# Patient Record
Sex: FEMALE | Race: WHITE | Employment: FULL TIME | URBAN - NONMETROPOLITAN AREA
[De-identification: names, ages, dates, MRNs, and addresses within clinical notes are randomized per-mention and may not be internally consistent; named-entity substitution may affect disease eponyms.]

---
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
## 2021-01-05 ENCOUNTER — TELEPHONE (OUTPATIENT)
Dept: OBGYN CLINIC | Age: 21
End: 2021-01-05

## 2021-01-06 ENCOUNTER — ROUTINE PRENATAL (OUTPATIENT)
Dept: OBGYN CLINIC | Age: 21
End: 2021-01-06

## 2021-01-06 ENCOUNTER — OFFICE VISIT (OUTPATIENT)
Dept: OBGYN CLINIC | Age: 21
End: 2021-01-06
Payer: COMMERCIAL

## 2021-01-06 VITALS
DIASTOLIC BLOOD PRESSURE: 70 MMHG | WEIGHT: 139.2 LBS | BODY MASS INDEX: 23.16 KG/M2 | HEART RATE: 104 BPM | SYSTOLIC BLOOD PRESSURE: 110 MMHG

## 2021-01-06 DIAGNOSIS — O26.899 RH NEGATIVE STATE IN ANTEPARTUM PERIOD: ICD-10-CM

## 2021-01-06 DIAGNOSIS — Z67.91 RH NEGATIVE STATE IN ANTEPARTUM PERIOD: ICD-10-CM

## 2021-01-06 DIAGNOSIS — Z34.92 PRENATAL CARE IN SECOND TRIMESTER: Primary | ICD-10-CM

## 2021-01-06 DIAGNOSIS — Z87.59 HISTORY OF MOLAR PREGNANCY: ICD-10-CM

## 2021-01-06 PROCEDURE — 76816 OB US FOLLOW-UP PER FETUS: CPT | Performed by: OBSTETRICS & GYNECOLOGY

## 2021-01-06 PROCEDURE — 0502F SUBSEQUENT PRENATAL CARE: CPT | Performed by: OBSTETRICS & GYNECOLOGY

## 2021-01-06 NOTE — PROGRESS NOTES
Return OB Office Visit    CC:   Chief Complaint   Patient presents with    Routine Prenatal Visit       HPI:  Pt seen and examined. No concerns/complaints. Denies VB, LOF, ctx. +FM. Maternal wellness questionnaire reviewed - no concerns today. Score 2. Objective:  /70   Pulse (!) 104   Wt 139 lb 3.2 oz (63.1 kg)   LMP 2020 (Exact Date)   BMI 23.16 kg/m²   Gen: AO, NAD  Abd: Soft, NT  FHT: 150    Assessment/Plan:  24 y.o.  at 24w1d (Estimated Date of Delivery: 21) presents for BRUCE appointment:    Problem List Items Addressed This Visit        Gynecology/Obstetric Problems    Prenatal care in second trimester - Primary     - FWB: reassuring by US today  - Genetic screening: patient declines at this time  - Anatomy scan: 12/10/2020 - suboptimal anatomy (no profile, spine, PCI), slightly echogenic bowel noted, normal fluid, CL 5.65cm, anterior placenta, female fetus    - completion anatomy 2020 with resolved echogenic bowel  - Flu shot: 10/14/2020  - Tdap: plan at 28wks  - PNL: A-/ab-, rubella immune, syphilis neg, HIV neg, HepB neg, Hemoglobin 13.7, UDS neg, GCCT/trich neg, UCx no growth         Rh negative state in antepartum period     Will need rhogam at 28wks and PP            Other    History of molar pregnancy     Short interval between surgery and recent pregnancy. Plan to monitor closely and will trend HCG after delivery           Other Visit Diagnoses     Echogenic bowel of fetus        Resolved on US today, discussed limitation of US and offered additional genetic testing wtih [id number] or CF carrier screening.  Pt declines at this time, CTM          Dispo: RTC in 4 weeks, GTT/CBC and growth US at that time  Mack Brock MD

## 2021-01-07 NOTE — ASSESSMENT & PLAN NOTE
- FWB: reassuring by US today  - Genetic screening: patient declines at this time  - Anatomy scan: 12/10/2020 - suboptimal anatomy (no profile, spine, PCI), slightly echogenic bowel noted, normal fluid, CL 5.65cm, anterior placenta, female fetus    - completion anatomy 1/7/2020 with resolved echogenic bowel  - Flu shot: 10/14/2020  - Tdap: plan at 28wks  - PNL: A-/ab-, rubella immune, syphilis neg, HIV neg, HepB neg, Hemoglobin 13.7, UDS neg, GCCT/trich neg, UCx no growth

## 2021-02-03 ENCOUNTER — OFFICE VISIT (OUTPATIENT)
Dept: OBGYN CLINIC | Age: 21
End: 2021-02-03
Payer: COMMERCIAL

## 2021-02-03 ENCOUNTER — ROUTINE PRENATAL (OUTPATIENT)
Dept: OBGYN CLINIC | Age: 21
End: 2021-02-03
Payer: COMMERCIAL

## 2021-02-03 VITALS
SYSTOLIC BLOOD PRESSURE: 106 MMHG | WEIGHT: 141.6 LBS | DIASTOLIC BLOOD PRESSURE: 68 MMHG | BODY MASS INDEX: 23.56 KG/M2 | HEART RATE: 99 BPM

## 2021-02-03 DIAGNOSIS — O35.EXX0 ANOMALY OF KIDNEY OF FETUS IN SINGLETON PREGNANCY: ICD-10-CM

## 2021-02-03 DIAGNOSIS — Z34.93 PRENATAL CARE IN THIRD TRIMESTER: Primary | ICD-10-CM

## 2021-02-03 DIAGNOSIS — Z87.59 HISTORY OF MOLAR PREGNANCY: ICD-10-CM

## 2021-02-03 DIAGNOSIS — O26.899 RH NEGATIVE STATE IN ANTEPARTUM PERIOD: ICD-10-CM

## 2021-02-03 DIAGNOSIS — Z67.91 RH NEGATIVE STATE IN ANTEPARTUM PERIOD: ICD-10-CM

## 2021-02-03 PROCEDURE — 76816 OB US FOLLOW-UP PER FETUS: CPT | Performed by: OBSTETRICS & GYNECOLOGY

## 2021-02-03 PROCEDURE — 36415 COLL VENOUS BLD VENIPUNCTURE: CPT | Performed by: OBSTETRICS & GYNECOLOGY

## 2021-02-03 PROCEDURE — 96372 THER/PROPH/DIAG INJ SC/IM: CPT | Performed by: OBSTETRICS & GYNECOLOGY

## 2021-02-03 PROCEDURE — 0502F SUBSEQUENT PRENATAL CARE: CPT | Performed by: OBSTETRICS & GYNECOLOGY

## 2021-02-03 NOTE — PROGRESS NOTES
Return OB Office Visit    CC:   Chief Complaint   Patient presents with    Routine Prenatal Visit       HPI:  Pt seen and examined. No concerns/complaints. Denies VB, LOF, ctx/cramps. +FM. Does have some     Maternal wellness questionnaire reviewed - no concerns today. Score 0. Objective:  /68   Pulse 99   Wt 141 lb 9.6 oz (64.2 kg)   LMP 07/21/2020 (Exact Date)   BMI 23.56 kg/m²   Gen: AO, NAD  Abd: Soft, NT  FHT: 141    Ultrasound:  Impression   OBSTETRICAL ULTRASOUND GROWTH       DATE: 02/03/2021       PHYSICIAN: Lieutenant Yaron MADSEN        SONOGRAPHER: LILIAM Lynn RUST       INDICATION: Growth       COMPARISON: 01/06/2021       TYPE OF SCAN: abdominal       FINDINGS:   A single viable intrauterine pregnancy is noted in cephalic presentation. Cardiac and somatic activity are noted.       The following values were obtained:   Fetal heart rate 141bpm   BPD 7.87cm 99 %   Head Circumference 27.54cm 78.7 %    Abdominal Circumference 25.19cm 79.1 %   Femur Length 5.44cm 53.4 %   Humerus Length 4.72cm 32.8 %   Amniotic fluid index 11.82cm   EFW 1388g 82.6 percentile       Amniotic fluid volume is normal. Based on sonographic criteria the estimated fetal age is 29weeks and 4days with EDC of 04/17/2021. There is a 10 day discordance with the established EDC of 04/27/2021.        The patient has an anterior placenta that is adequate distance in relation to the internal cervical os. The evaluation of the lower uterine segment and cervix reveals normal appearing anatomy.  The uterus is unremarkable/gravid.  Maternal ovaries and adnexae are not well visualized due to the size of the uterus and patient's gravid state.       Anatomy seen includes: heart, stomach, bladder   Fetal kidneys are enlarged bilaterally (right 4.59cm, left 3.66cm)       IMPRESSION:   Single live IUP in the third trimester. Adequate interval fetal growth. Enlarged bilateral kidneys.       Imaging is limited secondary to fetal position.    The patient is well aware of the limitations of ultrasound in the detection of anomalies.             Assessment/Plan:  24 y.o.  at 28w1d (Estimated Date of Delivery: 21) presents for [facility] [id number] appointment:     Problem List Items Addressed This Visit        Gynecology/Obstetric Problems    Prenatal care in third trimester - Primary     - FWB: reassuring by US today  - Genetic screening: patient declines at this time  - Anatomy scan: 12/10/2020 - suboptimal anatomy (no profile, spine, PCI), slightly echogenic bowel noted, normal fluid, CL 5.65cm, anterior placenta, female fetus    - completion anatomy 2020 with resolved echogenic bowel  - Flu shot: 10/14/2020  - Tdap: given 2/3/21  - PNL: A-/ab-, rubella immune, syphilis neg, HIV neg, HepB neg, Hemoglobin 13.7, UDS neg, GCCT/trich neg, UCx no growth    - GTT/CBC sent today         Relevant Orders    GLUCOSE CHALLENGE GESTATIONAL (Completed)    CBC WITH AUTO DIFFERENTIAL (Completed)    Rh negative state in antepartum period     Rhogam given 2/3/21  - plan postpartum work-up            Other    History of molar pregnancy     Short interval between surgery and recent pregnancy. 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  Will refer to [facility] for further ultrasound imaging/recommendatiosn          Dispo: RTC in 2 weeks  Almeta Meigs, MD

## 2021-02-03 NOTE — PROGRESS NOTES
Temp-97.3F infrared  Blood draw from L Cephalic x 1 attempt without difficulty. 0 SST, 1 PST, 1 LAV tubes drawn. Patient tolerated well. Shirin Frazier  12:25 PM Given Tdap (Adacel) vaccine 0.5mL IM  Site:Left deltoid. Lot #[id number]  Expiration Date: 11/07/22  [address] #[id number]. Patient tolerated well. No reaction noted after 20 minutes. VIS sheet provided. Administered by: Shirin Frazier   12:25 PM Given RhoGAM 300mcg IM  Blood Type:A neg  Current gestation: 28 weeks 1 days  Site:Left upper quad. gluteus. Lot #[id number]  Expiration Date: 12ARF8361  NDC #3188-7337-98. Patient tolerated well. No reaction noted after 20 minutes. ID card provided to patient. Administered by:  Shirin Marr

## 2021-02-04 ENCOUNTER — TELEPHONE (OUTPATIENT)
Dept: OBGYN CLINIC | Age: 21
End: 2021-02-04

## 2021-02-04 LAB
ABO/RH: NORMAL
ANTIBODY SCREEN: NORMAL
BASOPHILS ABSOLUTE: 0 K/UL (ref 0–0.2)
BASOPHILS RELATIVE PERCENT: 0.5 %
EOSINOPHILS ABSOLUTE: 0 K/UL (ref 0–0.6)
EOSINOPHILS RELATIVE PERCENT: 0.2 %
GLUCOSE CHALLENGE: 95 MG/DL
HCT VFR BLD CALC: 37.6 % (ref 36–48)
HEMOGLOBIN: 12.3 G/DL (ref 12–16)
LYMPHOCYTES ABSOLUTE: 0.8 K/UL (ref 1–5.1)
LYMPHOCYTES RELATIVE PERCENT: 8.5 %
MCH RBC QN AUTO: 30 PG (ref 26–34)
MCHC RBC AUTO-ENTMCNC: 32.7 G/DL (ref 31–36)
MCV RBC AUTO: 91.8 FL (ref 80–100)
MONOCYTES ABSOLUTE: 0.4 K/UL (ref 0–1.3)
MONOCYTES RELATIVE PERCENT: 4.4 %
NEUTROPHILS ABSOLUTE: 7.7 K/UL (ref 1.7–7.7)
NEUTROPHILS RELATIVE PERCENT: 86.4 %
PDW BLD-RTO: 14.2 % (ref 12.4–15.4)
PLATELET # BLD: 156 K/UL (ref 135–450)
PMV BLD AUTO: 9.6 FL (ref 5–10.5)
RBC # BLD: 4.1 M/UL (ref 4–5.2)
WBC # BLD: 9 K/UL (ref 4–11)

## 2021-02-04 NOTE — TELEPHONE ENCOUNTER
Pt calling again today. She says she started having a sharp pain from her belly up to her rib area. It has been pretty constant. She denies any bleeding or abnormal vaginal discharge. +FM last felt a few minutes ago. Pt states she thinks it may be Firman Calkin but was not sure. Pt encouraged to continue to rest and Hydrate today.  Please advise ( next appt 2/17/21)

## 2021-02-04 NOTE — TELEPHONE ENCOUNTER
Agree, would have her continue to monitor, stay hydrated throughout the day today and let us know if symptoms persist. Thanks.

## 2021-02-04 NOTE — ASSESSMENT & PLAN NOTE
- FWB: reassuring by US today  - Genetic screening: patient declines at this time  - Anatomy scan: 12/10/2020 - suboptimal anatomy (no profile, spine, PCI), slightly echogenic bowel noted, normal fluid, CL 5.65cm, anterior placenta, female fetus    - completion anatomy 1/7/2020 with resolved echogenic bowel  - Flu shot: 10/14/2020  - Tdap: given 2/3/21  - PNL: A-/ab-, rubella immune, syphilis neg, HIV neg, HepB neg, Hemoglobin 13.7, UDS neg, GCCT/trich neg, UCx no growth    - GTT/CBC sent today

## 2021-02-17 ENCOUNTER — ROUTINE PRENATAL (OUTPATIENT)
Dept: OBGYN CLINIC | Age: 21
End: 2021-02-17

## 2021-02-17 VITALS
WEIGHT: 148.4 LBS | DIASTOLIC BLOOD PRESSURE: 86 MMHG | SYSTOLIC BLOOD PRESSURE: 114 MMHG | HEART RATE: 106 BPM | BODY MASS INDEX: 24.7 KG/M2

## 2021-02-17 DIAGNOSIS — Z34.93 PRENATAL CARE IN THIRD TRIMESTER: Primary | ICD-10-CM

## 2021-02-17 DIAGNOSIS — Z67.91 RH NEGATIVE STATE IN ANTEPARTUM PERIOD: ICD-10-CM

## 2021-02-17 DIAGNOSIS — O35.EXX0 ANOMALY OF KIDNEY OF FETUS IN SINGLETON PREGNANCY: ICD-10-CM

## 2021-02-17 DIAGNOSIS — O26.899 RH NEGATIVE STATE IN ANTEPARTUM PERIOD: ICD-10-CM

## 2021-02-17 DIAGNOSIS — Z87.59 HISTORY OF MOLAR PREGNANCY: ICD-10-CM

## 2021-02-17 PROCEDURE — 0502F SUBSEQUENT PRENATAL CARE: CPT | Performed by: OBSTETRICS & GYNECOLOGY

## 2021-02-17 NOTE — PROGRESS NOTES
Temp-97.4f infrared  Maternal emotional well being screening form completed and reviewed with patient. Current score is 2. Patient given referral to [facility] ([phone]):  No

## 2021-02-17 NOTE — PROGRESS NOTES
Return OB Office Visit    CC:   Chief Complaint   Patient presents with    Routine Prenatal Visit       HPI:  Pt seen and examined. No concerns/complaints. Denies VB, LOF, ctx. +FM. Saw MFM yesterday for US, reports scanned in media but noted to be only slightly enlarged kidney on one side. Maternal wellness questionnaire reviewed - no concerns today. Score 0. Objective:  /86   Pulse (!) 106   Wt 148 lb 6.4 oz (67.3 kg)   LMP 2020 (Exact Date)   BMI 24.70 kg/m²   Gen: AO, NAD  Abd: Soft, NT  FHT: 135  FH: 30cm, vertex by Leopolds  Ext: Mild LE edema    Assessment/Plan:  24 y.o.  at 30w1d (Estimated Date of Delivery: 21) presents for BRUCE appointment:     Problem List Items Addressed This Visit        Gynecology/Obstetric Problems    Prenatal care in third trimester - Primary     - FWB: reassuring by US today  - Genetic screening: patient declines at this time  - Anatomy scan: 12/10/2020 - suboptimal anatomy (no profile, spine, PCI), slightly echogenic bowel noted, normal fluid, CL 5.65cm, anterior placenta, female fetus    - completion anatomy 2020 with resolved echogenic bowel  - Flu shot: 10/14/2020  - Tdap: given 2/3/21  - PNL: A-/ab-, rubella immune, syphilis neg, HIV neg, HepB neg, Hemoglobin 13.7, UDS neg, GCCT/trich neg, UCx no growth    - GTT 95, Hgb 12.3, Platelets 795  - Breast pump rx sent 2/3/21         Rh negative state in antepartum period     Rhogam given 2/3/21  - plan postpartum work-up         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  Plan to monitor closely and will trend HCG after delivery               Dispo: RTC in 2 weeks  Soy Gibbs MD

## 2021-02-18 NOTE — ASSESSMENT & PLAN NOTE
- FWB: reassuring by US today  - Genetic screening: patient declines at this time  - Anatomy scan: 12/10/2020 - suboptimal anatomy (no profile, spine, PCI), slightly echogenic bowel noted, normal fluid, CL 5.65cm, anterior placenta, female fetus    - completion anatomy 1/7/2020 with resolved echogenic bowel  - Flu shot: 10/14/2020  - Tdap: given 2/3/21  - PNL: A-/ab-, rubella immune, syphilis neg, HIV neg, HepB neg, Hemoglobin 13.7, UDS neg, GCCT/trich neg, UCx no growth    - GTT 95, Hgb 12.3, Platelets 765  - Breast pump rx sent 2/3/21

## 2021-03-03 ENCOUNTER — ROUTINE PRENATAL (OUTPATIENT)
Dept: OBGYN CLINIC | Age: 21
End: 2021-03-03

## 2021-03-03 VITALS
WEIGHT: 148.2 LBS | SYSTOLIC BLOOD PRESSURE: 102 MMHG | DIASTOLIC BLOOD PRESSURE: 80 MMHG | BODY MASS INDEX: 24.66 KG/M2 | HEART RATE: 99 BPM

## 2021-03-03 DIAGNOSIS — O35.EXX0 ANOMALY OF KIDNEY OF FETUS IN SINGLETON PREGNANCY: ICD-10-CM

## 2021-03-03 DIAGNOSIS — O21.9 NAUSEA AND VOMITING OF PREGNANCY, ANTEPARTUM: ICD-10-CM

## 2021-03-03 DIAGNOSIS — Z87.59 HISTORY OF MOLAR PREGNANCY: ICD-10-CM

## 2021-03-03 DIAGNOSIS — Z34.93 PRENATAL CARE IN THIRD TRIMESTER: Primary | ICD-10-CM

## 2021-03-03 DIAGNOSIS — O26.899 RH NEGATIVE STATE IN ANTEPARTUM PERIOD: ICD-10-CM

## 2021-03-03 DIAGNOSIS — Z67.91 RH NEGATIVE STATE IN ANTEPARTUM PERIOD: ICD-10-CM

## 2021-03-03 PROCEDURE — 0502F SUBSEQUENT PRENATAL CARE: CPT | Performed by: OBSTETRICS & GYNECOLOGY

## 2021-03-03 NOTE — PROGRESS NOTES
Temp: 97.1f oral  Maternal emotional well being screening form completed and reviewed with patient. Current score is 0. Patient given referral to [address] [place] [address] ([phone]):  No

## 2021-03-03 NOTE — PROGRESS NOTES
Return OB Office Visit    CC:   Chief Complaint   Patient presents with    Routine Prenatal Visit       HPI:  Patient seen and examined. Overall doing well today. Denies VB, LOF, ctx. Reports increased watery discharge for the past couple of nights. Denies need to wear a pad. Reports mild cramping, no contractions. +FM. Denies headaches, vision changes, RUQ pain, increased LE edema. Denies chest pain, shortness of breath, fever, chills, nausea, vomiting. Objective:  /80   Pulse 99   Wt 148 lb 3.2 oz (67.2 kg)   LMP 2020 (Exact Date)   BMI 24.66 kg/m²     Physical Exam  Vitals signs reviewed. Exam conducted with a chaperone present. Constitutional:       General: She is not in acute distress. Appearance: She is well-developed. HENT:      Head: Normocephalic and atraumatic. Eyes:      Conjunctiva/sclera: Conjunctivae normal.   Cardiovascular:      Rate and Rhythm: Normal rate. Pulmonary:      Effort: Pulmonary effort is normal. No respiratory distress. Abdominal:      General: There is no distension. Palpations: Abdomen is soft. Tenderness: There is no abdominal tenderness. There is no guarding or rebound. Genitourinary:     General: Normal vulva. Vagina: No vaginal discharge. Comments: Negative ferning, pooling and nitrazine  Musculoskeletal:         General: No swelling. Skin:     General: Skin is warm and dry. Neurological:      Mental Status: She is alert and oriented to person, place, and time. Psychiatric:         Mood and Affect: Mood normal.         Behavior: Behavior normal.         Thought Content: Thought content normal.         FHR: 147 bpm by doppler    Assessment/Plan:   Madeleine Coronado is a 24 y.o.  at 32w1d who presents for routine OB visit    1.  Prenatal care in third trimester     - Doing well today without complaints     - Fetal wellbeing reassuring by FH and FHR today     - Maternal wellness questionnaire reviewed - no concerns today, score 0     - Clear watery discharge - negative evaluation for PPROM today     - Labor, decreased FM, VB, LOF precautions reviewed     - Follow-up in 2 weeks for BRUCE visit and repeat US of fetal kidneys    2. Rh negative state in antepartum period     - Received Rhogam at 28 weeks     - Plan for PP evaluation    3. 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 Nausea and vomiting of pregnancy, antepartum     - Doing well    5.  History of molar pregnancy      Guille Young DO

## 2021-03-04 ENCOUNTER — TELEPHONE (OUTPATIENT)
Dept: OBGYN CLINIC | Age: 21
End: 2021-03-04

## 2021-03-05 NOTE — TELEPHONE ENCOUNTER
[phone] (home)     Patient called and made aware. Verbalized understanding. No questions or concerns voiced.

## 2021-03-05 NOTE — TELEPHONE ENCOUNTER
[phone] (home)     Called patient, stated she is cramping slightly now as well. Stated she is having slight discharge \"watery\" sometimes with white noted in it. Denies bug gush of fluid, was checked on weds for water breaking and was negative.      Routing to Dr. Neo Lipscomb

## 2021-03-05 NOTE — TELEPHONE ENCOUNTER
Would continue to observe and encourage hydration if symptoms are the same as in the office earlier this week. Discharge can increase towards the end of pregnancy, but if she has a large gush of fluid or is needed to change pads regularly will need evaluation. Also given labor precautions, encourage hydration to help with cramping. Thanks.

## 2021-03-10 ENCOUNTER — TELEPHONE (OUTPATIENT)
Dept: OBGYN CLINIC | Age: 21
End: 2021-03-10

## 2021-03-10 NOTE — TELEPHONE ENCOUNTER
Pt states she spoke with you yesterday and was instructed to come in office today but she is feeling better, no longer having pressure and feels she is ok to wait till schedule appointment on 3/17.  Routing as Marcelina Ford

## 2021-03-12 ENCOUNTER — ROUTINE PRENATAL (OUTPATIENT)
Dept: OBGYN CLINIC | Age: 21
End: 2021-03-12

## 2021-03-12 VITALS
BODY MASS INDEX: 24.93 KG/M2 | DIASTOLIC BLOOD PRESSURE: 82 MMHG | SYSTOLIC BLOOD PRESSURE: 112 MMHG | HEART RATE: 119 BPM | WEIGHT: 149.8 LBS

## 2021-03-12 DIAGNOSIS — R35.0 URINARY FREQUENCY: ICD-10-CM

## 2021-03-12 DIAGNOSIS — N89.8 VAGINAL DISCHARGE DURING PREGNANCY IN THIRD TRIMESTER: Primary | ICD-10-CM

## 2021-03-12 DIAGNOSIS — O26.893 VAGINAL DISCHARGE DURING PREGNANCY IN THIRD TRIMESTER: Primary | ICD-10-CM

## 2021-03-12 PROCEDURE — 0502F SUBSEQUENT PRENATAL CARE: CPT | Performed by: OBSTETRICS & GYNECOLOGY

## 2021-03-12 NOTE — PROGRESS NOTES
UA CHEMSTRIP      Glucose -NEG  Bilirubin- NEG   Ketone- NEG  Sp. Grav - 1.030  Blood- + small  PH- 6.5  Protein- NEG  urobil- NEG  Nitrates- NEG  Leukocyte- NEG

## 2021-03-12 NOTE — PROGRESS NOTES
Return OB Office Visit    CC:   Chief Complaint   Patient presents with    Vaginal Discharge       HPI:  Pt here today for vaginal discharge. Has had a lot of vaginal pressure. Also having more watery discharge since her last appointment. No contractions, no VB, +FM. Says she got really nervous about the amount of discharge so wanted to come in to be evaluated. Also reporting the constant urge to urinate, no dysuria, no hematuria, no malodorous urine. +frequency and urgency, worse towards end of the day. Maternal wellness questionnaire reviewed - no concerns today. Score 0. Objective:  /82   Pulse (!) 119   Wt 149 lb 12.8 oz (67.9 kg)   LMP 2020 (Exact Date)   BMI 24.93 kg/m²   Gen: AO, NAD  Abd: Soft, NT  FHT: 153  FH: 32cm, veertex by Leopolds  Ext: Mild LE edema  SVE: closed/long/high  SSE: negative pool/fern/valsalva/nitrazine    Assessment/Plan:  24 y.o.  at 33w3d (Estimated Date of Delivery: 21) presents for vaginal discharge    1. Vaginal discharge during pregnancy in third trimester  Patient with increasing discharge, exam negative for ROM today. Suspect likely physiologic, return precautions reviewed. Cervix closed, discsused use of belly band to help with vaginal pressure and labor precautions discussed as well.     2. Urinary frequency  POCT UA negative for leuk/nitrites, will send culture today and treat as indicated  - URINALYSIS WITH MICROSCOPIC  - Culture, Urine    Dispo: RTC as scheduled for routine OB visit  Sumeet Gonzales MD

## 2021-03-12 NOTE — PROGRESS NOTES
Temp-97.0F infrared  Maternal emotional well being screening form completed and reviewed with patient. Current score is 0. Patient given referral to [address] [place] [address] ([phone]):  No

## 2021-03-13 LAB
BACTERIA: ABNORMAL /HPF
BILIRUBIN URINE: NEGATIVE
BLOOD, URINE: NEGATIVE
CLARITY: ABNORMAL
COLOR: YELLOW
EPITHELIAL CELLS, UA: 27 /HPF (ref 0–5)
GLUCOSE URINE: NEGATIVE MG/DL
HYALINE CASTS: 7 /LPF (ref 0–8)
KETONES, URINE: NEGATIVE MG/DL
LEUKOCYTE ESTERASE, URINE: ABNORMAL
MICROSCOPIC EXAMINATION: YES
NITRITE, URINE: NEGATIVE
PH UA: 7 (ref 5–8)
PROTEIN UA: NEGATIVE MG/DL
RBC UA: 2 /HPF (ref 0–4)
SPECIFIC GRAVITY UA: 1.02 (ref 1–1.03)
URINE TYPE: ABNORMAL
UROBILINOGEN, URINE: 0.2 E.U./DL
WBC UA: 9 /HPF (ref 0–5)

## 2021-03-14 LAB — URINE CULTURE, ROUTINE: NORMAL

## 2021-03-16 ENCOUNTER — TELEPHONE (OUTPATIENT)
Dept: OBGYN CLINIC | Age: 21
End: 2021-03-16

## 2021-03-17 ENCOUNTER — ROUTINE PRENATAL (OUTPATIENT)
Dept: OBGYN CLINIC | Age: 21
End: 2021-03-17

## 2021-03-17 ENCOUNTER — OFFICE VISIT (OUTPATIENT)
Dept: OBGYN CLINIC | Age: 21
End: 2021-03-17
Payer: COMMERCIAL

## 2021-03-17 VITALS
BODY MASS INDEX: 25.13 KG/M2 | WEIGHT: 151 LBS | DIASTOLIC BLOOD PRESSURE: 80 MMHG | HEART RATE: 100 BPM | SYSTOLIC BLOOD PRESSURE: 104 MMHG

## 2021-03-17 DIAGNOSIS — Z87.59 HISTORY OF MOLAR PREGNANCY: ICD-10-CM

## 2021-03-17 DIAGNOSIS — Z67.91 RH NEGATIVE STATE IN ANTEPARTUM PERIOD: ICD-10-CM

## 2021-03-17 DIAGNOSIS — O35.EXX0 ANOMALY OF KIDNEY OF FETUS IN SINGLETON PREGNANCY: ICD-10-CM

## 2021-03-17 DIAGNOSIS — O35.EXX0 ANOMALY OF KIDNEY OF FETUS IN SINGLETON PREGNANCY: Primary | ICD-10-CM

## 2021-03-17 DIAGNOSIS — Z34.93 PRENATAL CARE IN THIRD TRIMESTER: Primary | ICD-10-CM

## 2021-03-17 DIAGNOSIS — O26.899 RH NEGATIVE STATE IN ANTEPARTUM PERIOD: ICD-10-CM

## 2021-03-17 PROCEDURE — 76816 OB US FOLLOW-UP PER FETUS: CPT | Performed by: OBSTETRICS & GYNECOLOGY

## 2021-03-17 PROCEDURE — 0502F SUBSEQUENT PRENATAL CARE: CPT | Performed by: OBSTETRICS & GYNECOLOGY

## 2021-03-17 NOTE — PROGRESS NOTES
Temp-98.0f infrared  Maternal emotional well being screening form completed and reviewed with patient. Current score is 0. Patient given referral to [address] [place] [address] ([phone]):  No

## 2021-03-18 NOTE — ASSESSMENT & PLAN NOTE
Short interval between surgery and recent pregnancy.  Plan to monitor closely and will trend HCG after delivery 2007

## 2021-03-18 NOTE — PROGRESS NOTES
Return OB Office Visit    CC:   Chief Complaint   Patient presents with    Routine Prenatal Visit       HPI:  Pt seen and examined. No concerns/complaints. Denies VB, LOF, ctx. +FM    Maternal wellness questionnaire reviewed - no concerns today. Score 0. Objective:  /80   Pulse 100   Wt 151 lb (68.5 kg)   LMP 2020 (Exact Date)   BMI 25.13 kg/m²   Gen: AO, NAD  Abd: Soft, NT  FHT: 150    Assessment/Plan:  24 y.o.  at 34w2d (Estimated Date of Delivery: 21) presents for BRUCE appointment:    Problem List Items Addressed This Visit        Gynecology/Obstetric Problems    Prenatal care in third trimester - Primary     - FWB: reassuring by US today  - Genetic screening: patient declines at this time  - Anatomy scan: 12/10/2020 - suboptimal anatomy (no profile, spine, PCI), slightly echogenic bowel noted, normal fluid, CL 5.65cm, anterior placenta, female fetus    - completion anatomy 2020 with resolved echogenic bowel  - Flu shot: 10/14/2020  - Tdap: given 2/3/21  - PNL: A-/ab-, rubella immune, syphilis neg, HIV neg, HepB neg, Hemoglobin 13.7, UDS neg, GCCT/trich neg, UCx no growth    - GTT 95, Hgb 12.3, Platelets 289  - Breast pump rx sent 2/3/21  - GBS next visit         Rh negative state in antepartum period     Rhogam given 2/3/21  - plan postpartum work-up         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  Plan to monitor closely and will trend HCG after delivery             Dispo: RTC in 2 weeks, GBS at that time  Enriqueta Lazo MD

## 2021-03-18 NOTE — ASSESSMENT & PLAN NOTE
Mildly enlarged left kidney on MFM US 2/15, recommend repeat at 34 weeks --> still enlarged, otherwise appear normal, per consult note in media most likely normal variant  - Rec  renal ultrasound

## 2021-03-18 NOTE — ASSESSMENT & PLAN NOTE
- FWB: reassuring by US today  - Genetic screening: patient declines at this time  - Anatomy scan: 12/10/2020 - suboptimal anatomy (no profile, spine, PCI), slightly echogenic bowel noted, normal fluid, CL 5.65cm, anterior placenta, female fetus    - completion anatomy 1/7/2020 with resolved echogenic bowel  - Flu shot: 10/14/2020  - Tdap: given 2/3/21  - PNL: A-/ab-, rubella immune, syphilis neg, HIV neg, HepB neg, Hemoglobin 13.7, UDS neg, GCCT/trich neg, UCx no growth    - GTT 95, Hgb 12.3, Platelets 422  - Breast pump rx sent 2/3/21  - GBS next visit

## 2021-03-31 ENCOUNTER — ROUTINE PRENATAL (OUTPATIENT)
Dept: OBGYN CLINIC | Age: 21
End: 2021-03-31

## 2021-03-31 VITALS
DIASTOLIC BLOOD PRESSURE: 86 MMHG | WEIGHT: 152 LBS | BODY MASS INDEX: 25.29 KG/M2 | HEART RATE: 122 BPM | SYSTOLIC BLOOD PRESSURE: 112 MMHG

## 2021-03-31 DIAGNOSIS — O35.EXX0 ANOMALY OF KIDNEY OF FETUS IN SINGLETON PREGNANCY: ICD-10-CM

## 2021-03-31 DIAGNOSIS — Z34.93 PRENATAL CARE IN THIRD TRIMESTER: Primary | ICD-10-CM

## 2021-03-31 DIAGNOSIS — Z67.91 RH NEGATIVE STATE IN ANTEPARTUM PERIOD: ICD-10-CM

## 2021-03-31 DIAGNOSIS — Z87.59 HISTORY OF MOLAR PREGNANCY: ICD-10-CM

## 2021-03-31 DIAGNOSIS — O26.899 RH NEGATIVE STATE IN ANTEPARTUM PERIOD: ICD-10-CM

## 2021-03-31 PROCEDURE — 0502F SUBSEQUENT PRENATAL CARE: CPT | Performed by: OBSTETRICS & GYNECOLOGY

## 2021-03-31 NOTE — PROGRESS NOTES
Temp-97.8F infrared  Maternal emotional well being screening form completed and reviewed with patient. Current score is 0. Patient given referral to [address] [place] [address] ([phone]):  No

## 2021-03-31 NOTE — ASSESSMENT & PLAN NOTE
- FWB: reassuring by lamont today  - Genetic screening: patient declines at this time  - Anatomy scan: 12/10/2020 - suboptimal anatomy (no profile, spine, PCI), slightly echogenic bowel noted, normal fluid, CL 5.65cm, anterior placenta, female fetus    - completion anatomy 1/7/2020 with resolved echogenic bowel  - Flu shot: 10/14/2020  - Tdap: given 2/3/21  - PNL: A-/ab-, rubella immune, syphilis neg, HIV neg, HepB neg, Hemoglobin 13.7, UDS neg, GCCT/trich neg, UCx no growth    - GTT 95, Hgb 12.3, Platelets 577  - Breast pump rx sent 2/3/21  - GBS sent today

## 2021-03-31 NOTE — PROGRESS NOTES
Return OB Office Visit    CC:   Chief Complaint   Patient presents with    Routine Prenatal Visit       HPI:  Pt seen and examined. No concerns/complaints. Denies VB, LOF, +FM. Has been feeling some lower abdominal cramps, on and off throughout the day. Worse yesterday. Otherwise no complaints. Continues to have acid reflux/heartburns. Has not really used anything. Maternal wellness questionnaire reviewed - no concerns today. Score 0.      Objective:  /86   Pulse (!) 122   Wt 152 lb (68.9 kg)   LMP 2020 (Exact Date)   BMI 25.29 kg/m²   Gen: AO, NAD  Abd: Soft, NT  FHT: 145  FH: 34cm, vertex by Leopolds  Ext: Mild LE edema  SVE: 3    Assessment/Plan:  24 y.o.  at 36w1d (Estimated Date of Delivery: 21) presents for RBUCE appointment:     Problem List Items Addressed This Visit        Gynecology/Obstetric Problems    Prenatal care in third trimester - Primary     - FWB: reassuring by lamont today  - Genetic screening: patient declines at this time  - Anatomy scan: 12/10/2020 - suboptimal anatomy (no profile, spine, PCI), slightly echogenic bowel noted, normal fluid, CL 5.65cm, anterior placenta, female fetus    - completion anatomy 2020 with resolved echogenic bowel  - Flu shot: 10/14/2020  - Tdap: given 2/3/21  - PNL: A-/ab-, rubella immune, syphilis neg, HIV neg, HepB neg, Hemoglobin 13.7, UDS neg, GCCT/trich neg, UCx no growth    - GTT 95, Hgb 12.3, Platelets 481  - Breast pump rx sent 2/3/21  - GBS sent today         Relevant Orders    Culture, Strep B Screen, Vaginal/Rectal    Rh negative state in antepartum period     Rhogam given 2/3/21  - plan postpartum work-up         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  Plan to monitor closely and will trend HCG after delivery               Dispo: RTC in 1 week  Leida Fermin MD

## 2021-04-03 LAB — GROUP B STREP CULTURE: NORMAL

## 2021-04-05 ENCOUNTER — ROUTINE PRENATAL (OUTPATIENT)
Dept: OBGYN CLINIC | Age: 21
End: 2021-04-05

## 2021-04-05 VITALS
SYSTOLIC BLOOD PRESSURE: 110 MMHG | BODY MASS INDEX: 25.66 KG/M2 | HEART RATE: 95 BPM | WEIGHT: 154.2 LBS | DIASTOLIC BLOOD PRESSURE: 78 MMHG

## 2021-04-05 DIAGNOSIS — O26.899 RH NEGATIVE STATE IN ANTEPARTUM PERIOD: ICD-10-CM

## 2021-04-05 DIAGNOSIS — Z34.93 PRENATAL CARE IN THIRD TRIMESTER: Primary | ICD-10-CM

## 2021-04-05 DIAGNOSIS — Z87.59 HISTORY OF MOLAR PREGNANCY: ICD-10-CM

## 2021-04-05 DIAGNOSIS — Z67.91 RH NEGATIVE STATE IN ANTEPARTUM PERIOD: ICD-10-CM

## 2021-04-05 DIAGNOSIS — O35.EXX0 ANOMALY OF KIDNEY OF FETUS IN SINGLETON PREGNANCY: ICD-10-CM

## 2021-04-05 PROCEDURE — 0502F SUBSEQUENT PRENATAL CARE: CPT | Performed by: OBSTETRICS & GYNECOLOGY

## 2021-04-05 NOTE — PROGRESS NOTES
Return OB Office Visit    CC:   Chief Complaint   Patient presents with    Routine Prenatal Visit       HPI:  Pt seen and examined. No concerns/complaints. Denies VB, LOF. +FM. Some irregular contractions and cramping. Some increased vaginal/pelvic pressure as well. Feels like she has to urinate all the time due to it. No dysuria. Maternal wellness questionnaire reviewed - no concerns today. Score 0.      Objective:  LMP 2020 (Exact Date)   Gen: AO, NAD  Abd: Soft, NT  FHT: 143  FH: 35cm, vertex by Leopolds  Ext: Mild LE edema  SVE: 2    Assessment/Plan:  24 y.o.  at 36w6d (Estimated Date of Delivery: 21) presents for BRUCE appointment:     Problem List Items Addressed This Visit        Gynecology/Obstetric Problems    Prenatal care in third trimester - Primary     - FWB: reassuring by lamont today  - Genetic screening: patient declines at this time  - Anatomy scan: 12/10/2020 - suboptimal anatomy (no profile, spine, PCI), slightly echogenic bowel noted, normal fluid, CL 5.65cm, anterior placenta, female fetus    - completion anatomy 2020 with resolved echogenic bowel  - Flu shot: 10/14/2020  - Tdap: given 2/3/21  - PNL: A-/ab-, rubella immune, syphilis neg, HIV neg, HepB neg, Hemoglobin 13.7, UDS neg, GCCT/trich neg, UCx no growth    - GTT 95, Hgb 12.3, Platelets 032  - Breast pump rx sent 2/3/21  - GBS negative         Rh negative state in antepartum period     Rhogam given 2/3/21  - plan postpartum work-up         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

## 2021-04-06 NOTE — ASSESSMENT & PLAN NOTE
- FWB: reassuring by lamont today  - Genetic screening: patient declines at this time  - Anatomy scan: 12/10/2020 - suboptimal anatomy (no profile, spine, PCI), slightly echogenic bowel noted, normal fluid, CL 5.65cm, anterior placenta, female fetus    - completion anatomy 1/7/2020 with resolved echogenic bowel  - Flu shot: 10/14/2020  - Tdap: given 2/3/21  - PNL: A-/ab-, rubella immune, syphilis neg, HIV neg, HepB neg, Hemoglobin 13.7, UDS neg, GCCT/trich neg, UCx no growth    - GTT 95, Hgb 12.3, Platelets 734  - Breast pump rx sent 2/3/21  - GBS negative

## 2021-04-07 ENCOUNTER — TELEPHONE (OUTPATIENT)
Dept: OBGYN CLINIC | Age: 21
End: 2021-04-07

## 2021-04-07 NOTE — TELEPHONE ENCOUNTER
Pt 37 weeks 1 day. She is calling due to contractions. She says she had \" tightening of her belly button and vaginal pressure once this am and then again this afternoon. She has + FM, No gush of fluid. She says she does have vaginal discharge that has been clear. Pt was advised to place a pad to monitor. Advised patient to monitor for contractions. If having more than 6 contractions in one hour, lasting longer than a minute, getting stronger to the point she has to stop what she is doing then report to labor and delivery for evaluation. Also advised to report to labor and delivery if she has decreased or no fetal movement or if she has any vaginal bleeding, leakage or gush of vaginal fluid. Routing to On- call provide in office.

## 2021-04-09 ENCOUNTER — TELEPHONE (OUTPATIENT)
Dept: OBGYN CLINIC | Age: 21
End: 2021-04-09

## 2021-04-09 ENCOUNTER — HOSPITAL ENCOUNTER (OUTPATIENT)
Age: 21
Discharge: HOME | End: 2021-04-09
Attending: OBSTETRICS & GYNECOLOGY | Admitting: OBSTETRICS & GYNECOLOGY
Payer: COMMERCIAL

## 2021-04-09 VITALS
HEART RATE: 102 BPM | SYSTOLIC BLOOD PRESSURE: 129 MMHG | RESPIRATION RATE: 16 BRPM | TEMPERATURE: 98.7 F | HEIGHT: 65 IN | BODY MASS INDEX: 24.66 KG/M2 | DIASTOLIC BLOOD PRESSURE: 79 MMHG | WEIGHT: 148 LBS

## 2021-04-09 PROCEDURE — 99211 OFF/OP EST MAY X REQ PHY/QHP: CPT

## 2021-04-09 PROCEDURE — 99234 HOSP IP/OBS SM DT SF/LOW 45: CPT | Performed by: OBSTETRICS & GYNECOLOGY

## 2021-04-09 RX ORDER — PROMETHAZINE HYDROCHLORIDE 25 MG/1
12.5 TABLET ORAL EVERY 6 HOURS PRN
Status: DISCONTINUED | OUTPATIENT
Start: 2021-04-09 | End: 2021-04-09 | Stop reason: HOSPADM

## 2021-04-09 RX ORDER — ONDANSETRON 2 MG/ML
4 INJECTION INTRAMUSCULAR; INTRAVENOUS EVERY 6 HOURS PRN
Status: DISCONTINUED | OUTPATIENT
Start: 2021-04-09 | End: 2021-04-09 | Stop reason: HOSPADM

## 2021-04-09 NOTE — PROGRESS NOTES
Patient off EFM for bedside ultrasound performed by Dr. Jie Jauregui to check fluid level. Fern negative and GAYLE of 9cm per Dr. Jie Jauregui. Patient to be discharged home.

## 2021-04-09 NOTE — PROGRESS NOTES
Patient arrives to [facility] triage complaining of cramping and leaking of fluid around 10:00AM this morning. Patient has felt fetal movement. Patient reports having intercourse 2 days ago. Patient reports clear fluid. Upon RN inspection, no visible fluid on perineum and none visible on pad.

## 2021-04-09 NOTE — TELEPHONE ENCOUNTER
Patient called in stated she had a gush of fluid come from her, and took a shower and is still continuing to leak, was advised to present to labor and delivery. Stated she [place] hour [place]. Called L&D and spoke with pan, gave her report on patient.      Routing to Dr. Soham Nelson (On Call)

## 2021-04-09 NOTE — PROGRESS NOTES
Pt verbalized understanding of verbal and written discharge instructions and denies having questions at this time. Pt left OB unit at 1411 ambulatory, undelivered, and in stable condition, accompanied by Freddie (significant other). Patient is not in active labor.

## 2021-04-09 NOTE — H&P
Department of Obstetrics and Gynecology  Labor and Delivery  Attending Triage Note      SUBJECTIVE:  25 y/o  female at 37 weeks 3 days gestation with [facility] 2021 presents to triage for evaluation secondary to leakage of fluid. Noted increase in vaginal discharge/leakage this morning at approximately 10 am.  Has noted increased discharge in the past week. Episode this morning was different and more consistent with a gush of fluid. Has noted persistent discharge as well. Denies vaginal bleeding. Last intercourse was 2 days ago. Denies regular contractions. Pregnancy has been complicated by Rh negative, enlarged left fetal kidney, reflux/heartburn, and history of molar pregnancy with short interval.      Review of Systems - History obtained from chart review and the patient  General ROS: negative for - chills, fatigue or fever  Respiratory ROS: no cough, shortness of breath, or wheezing  Cardiovascular ROS: no chest pain or dyspnea on exertion  Gastrointestinal ROS: negative for - abdominal pain, constipation, diarrhea or nausea/vomiting  Genito-Urinary ROS: no dysuria, trouble voiding, or hematuria  Neurological ROS: negative  Dermatological ROS: negative    No Known Allergies  No current facility-administered medications on file prior to encounter.       Current Outpatient Medications on File Prior to Encounter   Medication Sig Dispense Refill    Prenatal Vit-Fe Fumarate-FA (PRENATAL 1+1 PO) Take by mouth      ondansetron (ZOFRAN-ODT) 4 MG disintegrating tablet Take 1 tablet by mouth every 8 hours as needed for Nausea (Patient not taking: Reported on 2020) 20 tablet 1    pyridoxine (B-6) 25 MG tablet Take 1 tablet by mouth 2 times daily as needed (nausea) (Patient not taking: Reported on 10/14/2020) 30 tablet 1    doxyLAMINE succinate (GNP SLEEP AID) 25 MG tablet Take 0.5 tablets by mouth nightly as needed (nausea) (Patient not taking: Reported on 10/14/2020) 14 tablet 0    norethindrone (ORTHO MICRONOR) 0.35 MG tablet Take 1 tablet by mouth daily (Patient not taking: Reported on 10/14/2020) 28 tablet 3    ibuprofen (ADVIL;MOTRIN) 800 MG tablet Take 1 tablet by mouth every 8 hours as needed for Pain (Patient not taking: Reported on 10/14/2020) 20 tablet 1     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 2/17/2021    reports seeing specialist and specialist not concerned     Past Surgical History:   Procedure Laterality Date    DILATION AND CURETTAGE      DILATION AND CURETTAGE OF UTERUS N/A 5/26/2020    SUCTION DILATATION AND CURETTAGE performed by Reji Cordova [facility] at [facility] History     Socioeconomic History    Marital status: Single     Spouse name: Not on file    Number of children: Not on file    Years of education: Not on file    Highest education level: Not on file   Occupational History    Not on file   Social Needs    Financial resource strain: Not on file    Food insecurity     Worry: Not on file     Inability: Not on file    Transportation needs     Medical: Not on file     Non-medical: Not on file   Tobacco Use    Smoking status: Never Smoker    Smokeless tobacco: Never Used   Substance and Sexual Activity    Alcohol use: No    Drug use: No    Sexual activity: Yes     Partners: Male   Lifestyle    Physical activity     Days per week: Not on file     Minutes per session: Not on file    Stress: Not on file   Relationships    Social connections     Talks on phone: Not on file     Gets together: Not on file     Attends [employer] service: Not on file     Active member of club or organization: Not on file     Attends meetings of clubs or organizations: Not on file     Relationship status: Not on file    Intimate partner violence     Fear of current or ex partner: Not on file     Emotionally abused: Not on file     Physically abused: Not on file     Forced sexual activity: Not on file   Other Topics Concern    Not on file   Social History Narrative    Not on file     Family History   Problem Relation Age of Onset    Diabetes Paternal Grandfather     Stroke Paternal Grandfather     Diabetes Maternal Grandmother     Ovarian Cancer Father     Ovarian Cancer Mother     No Known Problems Brother     No Known Problems Sister     No Known Problems Sister     No Known Problems Sister      OB History        2    Para        Term                AB   1    Living           SAB        TAB        Ectopic        Molar   1    Multiple        Live Births                    OBJECTIVE    Vitals:  /79   Pulse (!) 102   Temp 98.7 °F (37.1 °C) (Oral)   Resp 16   Ht 5' 5\" (1.651 m)   Wt 148 lb (67.1 kg)   LMP 2020 (Exact Date)   BMI 24.63 kg/m²     CONSTITUTIONAL:  awake, alert, cooperative, no apparent distress, and appears stated age  LUNGS:  No increased work of breathing, good air exchange, clear to auscultation bilaterally, no crackles or wheezing  CARDIOVASCULAR:  normal S1 and S2  ABDOMEN:  soft, non-distended and non-tender  GENITAL/URINARY:  External Genitalia:  General appearance; normal, Hair distribution; normal, Lesions absent  MUSCULOSKELETAL:  full range of motion noted  NEUROLOGIC:  Mental Status Exam:  Level of Alertness:   awake  Orientation:   person, place, time  Memory:   normal  Fund of Knowledge:  normal  Attention/Concentration:  normal  Language:  normal  SKIN:  normal skin color, texture, turgor    Cervix:             Dilation:  1 cm         Effacement:  25%         Station:  -3 cm         Consistency:  soft           Membranes:  Intact  Ferning negative, pooling negative  Ultrasound consistent with normal GAYLE    Fetal heart rate:         Baseline Heart Rate:  150        Accelerations:  present       Decelerations:  absent       Variability:  moderate    Contraction frequency: no contractions    Pelvic Ultrasound:  GAYLE 9 cm,  6 cm pocket appreciated, vertex presentation, anterior

## 2021-04-09 NOTE — PROGRESS NOTES
Dr. Gregory Herrera notified of patient's arrival. Dr. Gregory Herrera says she will be up to perform FERN on patient.

## 2021-04-09 NOTE — PROGRESS NOTES
Dr. Carlos Guerrero at patient bedside for evaluation, FERN test, and SVE. Cervix 1cm per Dr. Carlos Guerrero.

## 2021-04-13 ENCOUNTER — ROUTINE PRENATAL (OUTPATIENT)
Dept: OBGYN CLINIC | Age: 21
End: 2021-04-13
Payer: COMMERCIAL

## 2021-04-13 VITALS
DIASTOLIC BLOOD PRESSURE: 70 MMHG | SYSTOLIC BLOOD PRESSURE: 100 MMHG | WEIGHT: 154.4 LBS | HEART RATE: 96 BPM | BODY MASS INDEX: 25.69 KG/M2

## 2021-04-13 DIAGNOSIS — Z34.93 PRENATAL CARE IN THIRD TRIMESTER: Primary | ICD-10-CM

## 2021-04-13 DIAGNOSIS — O26.899 RH NEGATIVE STATE IN ANTEPARTUM PERIOD: ICD-10-CM

## 2021-04-13 DIAGNOSIS — Z87.59 HISTORY OF MOLAR PREGNANCY: ICD-10-CM

## 2021-04-13 DIAGNOSIS — N89.8 VAGINAL DISCHARGE IN PREGNANCY IN THIRD TRIMESTER: ICD-10-CM

## 2021-04-13 DIAGNOSIS — O36.8130 DECREASED FETAL MOVEMENTS IN THIRD TRIMESTER, SINGLE OR UNSPECIFIED FETUS: ICD-10-CM

## 2021-04-13 DIAGNOSIS — Z67.91 RH NEGATIVE STATE IN ANTEPARTUM PERIOD: ICD-10-CM

## 2021-04-13 DIAGNOSIS — O26.893 VAGINAL DISCHARGE IN PREGNANCY IN THIRD TRIMESTER: ICD-10-CM

## 2021-04-13 PROCEDURE — 59025 FETAL NON-STRESS TEST: CPT | Performed by: OBSTETRICS & GYNECOLOGY

## 2021-04-13 PROCEDURE — 0502F SUBSEQUENT PRENATAL CARE: CPT | Performed by: OBSTETRICS & GYNECOLOGY

## 2021-04-13 NOTE — ASSESSMENT & PLAN NOTE
- FWB: reassuring by lamont today  - Genetic screening: patient declines at this time  - Anatomy scan: 12/10/2020 - suboptimal anatomy (no profile, spine, PCI), slightly echogenic bowel noted, normal fluid, CL 5.65cm, anterior placenta, female fetus    - completion anatomy 1/7/2020 with resolved echogenic bowel  - Flu shot: 10/14/2020  - Tdap: given 2/3/21  - PNL: A-/ab-, rubella immune, syphilis neg, HIV neg, HepB neg, Hemoglobin 13.7, UDS neg, GCCT/trich neg, UCx no growth    - GTT 95, Hgb 12.3, Platelets 482  - Breast pump rx sent 2/3/21  - GBS negative    Schedule IOL next visit if no spontaneous labor

## 2021-04-13 NOTE — PROGRESS NOTES
Temp-97.8f infrared  Maternal emotional well being screening form completed and reviewed with patient. Current score is 0. Patient given referral to [address] [place] [address] ([phone]):  No

## 2021-04-13 NOTE — PROGRESS NOTES
Return OB Office Visit    CC:   Chief Complaint   Patient presents with    Routine Prenatal Visit       HPI:  Pt seen and examined. No concerns/complaints. Denies VB, some mild cramps or contractions. Was seen in triage for rule-out ROM on 21. Still having some discharge but no big gush of fluid like last week. Some decreased FM today. Maternal wellness questionnaire reviewed - no concerns today. Score 0. Objective:  /70   Pulse 96   Wt 154 lb 6.4 oz (70 kg)   LMP 2020 (Exact Date)   BMI 25.69 kg/m²   Gen: AO, NAD  Abd: Soft, NT  FHT: 150  SVE: 2-3/50/-2  SSE: negative pool, fern, valsalva, nitrazine    Assessment/Plan:  24 y.o.  at 38w0d (Estimated Date of Delivery: 21) presents for BRUCE appointment:     Problem List Items Addressed This Visit        Gynecology/Obstetric Problems    Prenatal care in third trimester - Primary     - FWB: reassuring by doptones today  - Genetic screening: patient declines at this time  - Anatomy scan: 12/10/2020 - suboptimal anatomy (no profile, spine, PCI), slightly echogenic bowel noted, normal fluid, CL 5.65cm, anterior placenta, female fetus    - completion anatomy 2020 with resolved echogenic bowel  - Flu shot: 10/14/2020  - Tdap: given 2/3/21  - PNL: A-/ab-, rubella immune, syphilis neg, HIV neg, HepB neg, Hemoglobin 13.7, UDS neg, GCCT/trich neg, UCx no growth    - GTT 95, Hgb 12.3, Platelets 901  - Breast pump rx sent 2/3/21  - GBS negative    Schedule IOL next visit if no spontaneous labor         Rh negative state in antepartum period     Rhogam given 2/3/21  - plan postpartum work-up         Vaginal discharge in pregnancy in third trimester     Exam negative for SROM             Other    History of molar pregnancy     Short interval between surgery and recent pregnancy.  Plan to monitor closely and will trend HCG after delivery           Other Visit Diagnoses     Decreased fetal movements in third trimester, single or unspecified fetus        Reactive NST today    Relevant Orders    01873 - AK FETAL NON-STRESS TEST (Completed)          Dispo: RTC in 1 week  Jaylan Gage MD

## 2021-04-14 ENCOUNTER — TELEPHONE (OUTPATIENT)
Dept: OBGYN CLINIC | Age: 21
End: 2021-04-14

## 2021-04-14 ENCOUNTER — HOSPITAL ENCOUNTER (OUTPATIENT)
Age: 21
Discharge: HOME | End: 2021-04-14
Attending: OBSTETRICS & GYNECOLOGY | Admitting: OBSTETRICS & GYNECOLOGY
Payer: COMMERCIAL

## 2021-04-14 VITALS
DIASTOLIC BLOOD PRESSURE: 70 MMHG | WEIGHT: 148 LBS | RESPIRATION RATE: 16 BRPM | BODY MASS INDEX: 24.66 KG/M2 | HEART RATE: 92 BPM | HEIGHT: 65 IN | SYSTOLIC BLOOD PRESSURE: 114 MMHG | TEMPERATURE: 98 F

## 2021-04-14 PROCEDURE — 99211 OFF/OP EST MAY X REQ PHY/QHP: CPT

## 2021-04-14 NOTE — PROGRESS NOTES
Pt verbalized understanding of verbal and written discharge instructions and denies having questions at this time. Pt left OB unit at 1510 ambulatory, undelivered, and in stable condition, accompanied by Nidia Jalloh. Patient is not in active labor.

## 2021-04-14 NOTE — PROGRESS NOTES
Patient presents to [facility] triage with complaints of decreased fetal movement starting when she woke up this morning at 0700. Patient states that she has felt baby move a few times in the car ride here, but this morning she was not about to feel 10 kicks within two hours as instructed by her OB. Patient appears overall stable and well.  Will continue to monitor and notify on call OB of patient's arrival.

## 2021-04-14 NOTE — H&P
Department of Obstetrics and Gynecology   Obstetrics History and Physical        CHIEF COMPLAINT:  decreased fetal movement    HISTORY OF PRESENT ILLNESS:      The patient is a 24 y.o. female at 43w4d. OB History        2    Para        Term                AB   1    Living           SAB        TAB        Ectopic        Molar   1    Multiple        Live Births                Patient presents with a chief complaint as above and is being admitted for observation    Estimated Due Date: Estimated Date of Delivery: 21    PRENATAL CARE:    Complicated by: none    PAST OB HISTORY:  OB History        2    Para        Term                AB   1    Living           SAB        TAB        Ectopic        Molar   1    Multiple        Live Births                    Past Medical History:        Diagnosis Date    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 2021    reports seeing specialist and specialist not concerned     Past Surgical History:        Procedure Laterality Date    DILATION AND CURETTAGE      DILATION AND CURETTAGE OF UTERUS N/A 2020    SUCTION DILATATION AND CURETTAGE performed by Onofre Hayes MD at [address] [place] [address] Way:  Patient has no known allergies.     Social History:    Social History     Socioeconomic History    Marital status: Single     Spouse name: Not on file    Number of children: Not on file    Years of education: Not on file    Highest education level: Not on file   Occupational History    Not on file   Social Needs    Financial resource strain: Not on file    Food insecurity     Worry: Not on file     Inability: Not on file    Transportation needs     Medical: Not on file     Non-medical: Not on file   Tobacco Use    Smoking status: Never Smoker    Smokeless tobacco: Never Used   Substance and Sexual Activity    Alcohol use: No    Drug use: No    Sexual activity: Yes     Partners: Male   Lifestyle    Physical activity     Days per week: Not on file     Minutes per session: Not on file    Stress: Not on file   Relationships    Social connections     Talks on phone: Not on file     Gets together: Not on file     Attends [employer] service: Not on file     Active member of club or organization: Not on file     Attends meetings of clubs or organizations: Not on file     Relationship status: Not on file    Intimate partner violence     Fear of current or ex partner: Not on file     Emotionally abused: Not on file     Physically abused: Not on file     Forced sexual activity: Not on file   Other Topics Concern    Not on file   Social History Narrative    Not on file     Family History:       Problem Relation Age of Onset    Diabetes Paternal Grandfather     Stroke Paternal Grandfather     Diabetes Maternal Grandmother     Ovarian Cancer Father     Ovarian Cancer Mother     No Known Problems Brother     No Known Problems Sister     No Known Problems Sister     No Known Problems Sister      Medications Prior to Admission:  Medications Prior to Admission: Prenatal Vit-Fe Fumarate-FA (PRENATAL 1+1 PO), Take by mouth  ondansetron (ZOFRAN-ODT) 4 MG disintegrating tablet, Take 1 tablet by mouth every 8 hours as needed for Nausea (Patient not taking: Reported on 12/9/2020)  pyridoxine (B-6) 25 MG tablet, Take 1 tablet by mouth 2 times daily as needed (nausea) (Patient not taking: Reported on 10/14/2020)  doxyLAMINE succinate (GNP SLEEP AID) 25 MG tablet, Take 0.5 tablets by mouth nightly as needed (nausea) (Patient not taking: Reported on 10/14/2020)  norethindrone (ORTHO MICRONOR) 0.35 MG tablet, Take 1 tablet by mouth daily (Patient not taking: Reported on 10/14/2020)  ibuprofen (ADVIL;MOTRIN) 800 MG tablet, Take 1 tablet by mouth every 8 hours as needed for Pain (Patient not taking: Reported on 10/14/2020)    REVIEW OF SYSTEMS:    [facility]    PHYSICAL EXAM:  Vitals:    04/14/21 1407 04/14/21 1411   BP: 114/70    Pulse: 92    Resp: 16    Temp: 98 °F (36.7 °C)    TempSrc: Oral    Weight:  148 lb (67.1 kg)   Height:  5' 5\" (1.651 m)     General appearance:  awake, alert, cooperative, no apparent distress, and appears stated age  Neurologic:  Awake, alert, oriented to name, place and time. Lungs:  No increased work of breathing, good air exchange  Abdomen:  Soft, non tender, gravid, consistent with her gestational age, EFW by Leopold's maneuver was 7#  Fetal heart rate:  Reassuring. , moderate variability, reactive NST    Contraction frequency:  none    Membranes:  Intact      ASSESSMENT AND PLAN:    Decreased FM @ term 38 1/7 wks  NST-R. FM noted  Pt reassured, [facility] reviewed    Dr Carlin Moseley aware and agrees with plan of care.     Arian Castro

## 2021-04-14 NOTE — PROGRESS NOTES
Dr. Alan Manning called this RN and states that she has reviewed the EFM strip and is reassured. Patient to be seen by [facility] MD, Dr. Sukhdev Nava, and then to be discharged home.

## 2021-04-14 NOTE — PROGRESS NOTES
Dr. Robel Reynoso called and notified of patient's arrival to triage for decreased fetal movement. CAT I tracing at this time with no contractions. Patient appears overall stable and well. Dr. Robel Reynoso states she will review EFM tracing and that she would like the patient to stay on EFM and be monitored for an hour. Will continue to monitor.

## 2021-04-14 NOTE — TELEPHONE ENCOUNTER
Patient called in and stated she has been up since 7am and has only felt baby 2 times since getting up. Stated shes tried drinking water, eating, laying down and still not anything changing. Went ahead and sent to triage. Called and spoke with Gil Tijerina, made aware. Patient will be about an hour due to where she lives. Please advise.     Routing to Dr. Ja Whalen call)

## 2021-04-19 ENCOUNTER — ROUTINE PRENATAL (OUTPATIENT)
Dept: OBGYN CLINIC | Age: 21
End: 2021-04-19

## 2021-04-19 VITALS
WEIGHT: 156 LBS | DIASTOLIC BLOOD PRESSURE: 82 MMHG | HEART RATE: 97 BPM | SYSTOLIC BLOOD PRESSURE: 110 MMHG | BODY MASS INDEX: 25.96 KG/M2

## 2021-04-19 DIAGNOSIS — Z34.93 PRENATAL CARE IN THIRD TRIMESTER: Primary | ICD-10-CM

## 2021-04-19 DIAGNOSIS — Z67.91 RH NEGATIVE STATE IN ANTEPARTUM PERIOD: ICD-10-CM

## 2021-04-19 DIAGNOSIS — O26.899 RH NEGATIVE STATE IN ANTEPARTUM PERIOD: ICD-10-CM

## 2021-04-19 DIAGNOSIS — O35.EXX0 ANOMALY OF KIDNEY OF FETUS IN SINGLETON PREGNANCY: ICD-10-CM

## 2021-04-19 DIAGNOSIS — Z87.59 HISTORY OF MOLAR PREGNANCY: ICD-10-CM

## 2021-04-19 PROCEDURE — 0502F SUBSEQUENT PRENATAL CARE: CPT | Performed by: OBSTETRICS & GYNECOLOGY

## 2021-04-19 NOTE — PROGRESS NOTES
Temp-97.8f infrared  Maternal emotional well being screening form completed and reviewed with patient. Current score is 0. Patient given referral to [address] [place] [address] ([phone]):  No

## 2021-04-19 NOTE — ASSESSMENT & PLAN NOTE
- FWB: reassuring by lamont today  - Genetic screening: patient declines at this time  - Anatomy scan: 12/10/2020 - suboptimal anatomy (no profile, spine, PCI), slightly echogenic bowel noted, normal fluid, CL 5.65cm, anterior placenta, female fetus    - completion anatomy 1/7/2020 with resolved echogenic bowel  - Flu shot: 10/14/2020  - Tdap: given 2/3/21  - PNL: A-/ab-, rubella immune, syphilis neg, HIV neg, HepB neg, Hemoglobin 13.7, UDS neg, GCCT/trich neg, UCx no growth    - GTT 95, Hgb 12.3, Platelets 620  - Breast pump rx sent 2/3/21  - GBS negative    IOL scheduled 4/20/21 at 0700, pt given information for L&D

## 2021-04-19 NOTE — PROGRESS NOTES
Return OB Office Visit    CC:   Chief Complaint   Patient presents with    Routine Prenatal Visit       HPI:  Pt seen and examined. No concerns/complaints. Denies VB, LOF, still having discharge. +FM. Hurts with walking more too, no regular contractions today but the other day had them every 8-10 minutes. Having more vaginal pain/pressure as well. Would like to be scheduled for induction ASAP. Maternal wellness questionnaire reviewed - no concerns today. Score 0.      Objective:  /82   Pulse 97   Wt 156 lb (70.8 kg)   LMP 2020 (Exact Date)   BMI 25.96 kg/m²   Gen: AO, NAD  Abd: Soft, NT  FHT: 152  FH: 36cm, vertex by Leopolds  Ext: Mild LE edema  SVE: 3/50/-2    Assessment/Plan:  24 y.o.  at 38w6d (Estimated Date of Delivery: 21) presents for BRUCE appointment:     Problem List Items Addressed This Visit        Gynecology/Obstetric Problems    Prenatal care in third trimester - Primary     - FWB: reassuring by lamont today  - Genetic screening: patient declines at this time  - Anatomy scan: 12/10/2020 - suboptimal anatomy (no profile, spine, PCI), slightly echogenic bowel noted, normal fluid, CL 5.65cm, anterior placenta, female fetus    - completion anatomy 2020 with resolved echogenic bowel  - Flu shot: 10/14/2020  - Tdap: given 2/3/21  - PNL: A-/ab-, rubella immune, syphilis neg, HIV neg, HepB neg, Hemoglobin 13.7, UDS neg, GCCT/trich neg, UCx no growth    - GTT 95, Hgb 12.3, Platelets 661  - Breast pump rx sent 2/3/21  - GBS negative    IOL scheduled 21 at 0700, pt given information for L&D         Rh negative state in antepartum period     Rhogam given 2/3/21  - plan postpartum work-up         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     Mildly enlarged left kidney on MFM US 2/15, recommend repeat at 34 weeks --> still enlarged, otherwise appear normal, per consult note in media most likely normal variant  - Rec  renal ultrasound            Other History of molar pregnancy          Dispo: RTC for postpartum visit  Adrien Saucedo MD

## 2021-04-20 ENCOUNTER — ANESTHESIA EVENT (OUTPATIENT)
Dept: LABOR AND DELIVERY | Age: 21
End: 2021-04-20
Payer: COMMERCIAL

## 2021-04-20 ENCOUNTER — HOSPITAL ENCOUNTER (INPATIENT)
Age: 21
LOS: 3 days | Discharge: HOME | End: 2021-04-23
Attending: OBSTETRICS & GYNECOLOGY | Admitting: OBSTETRICS & GYNECOLOGY
Payer: COMMERCIAL

## 2021-04-20 ENCOUNTER — ANESTHESIA (OUTPATIENT)
Dept: LABOR AND DELIVERY | Age: 21
End: 2021-04-20
Payer: COMMERCIAL

## 2021-04-20 ENCOUNTER — APPOINTMENT (OUTPATIENT)
Dept: LABOR AND DELIVERY | Age: 21
End: 2021-04-20
Payer: COMMERCIAL

## 2021-04-20 PROBLEM — Z34.90 ENCOUNTER FOR INDUCTION OF LABOR: Status: ACTIVE | Noted: 2021-04-20

## 2021-04-20 LAB
ABO/RH: NORMAL
AMPHETAMINE SCREEN, URINE: NORMAL
ANTIBODY IDENTIFICATION: NORMAL
ANTIBODY SCREEN: NORMAL
BARBITURATE SCREEN URINE: NORMAL
BASOPHILS ABSOLUTE: 0 K/UL (ref 0–0.2)
BASOPHILS RELATIVE PERCENT: 0.4 %
BENZODIAZEPINE SCREEN, URINE: NORMAL
BUPRENORPHINE URINE: NORMAL
CANNABINOID SCREEN URINE: NORMAL
COCAINE METABOLITE SCREEN URINE: NORMAL
DAT IGG CAPTURE: NORMAL
EOSINOPHILS ABSOLUTE: 0 K/UL (ref 0–0.6)
EOSINOPHILS RELATIVE PERCENT: 0.5 %
HCT VFR BLD CALC: 37 % (ref 36–48)
HEMOGLOBIN: 12.4 G/DL (ref 12–16)
LYMPHOCYTES ABSOLUTE: 1.2 K/UL (ref 1–5.1)
LYMPHOCYTES RELATIVE PERCENT: 12.5 %
Lab: NORMAL
MCH RBC QN AUTO: 29.1 PG (ref 26–34)
MCHC RBC AUTO-ENTMCNC: 33.6 G/DL (ref 31–36)
MCV RBC AUTO: 86.7 FL (ref 80–100)
METHADONE SCREEN, URINE: NORMAL
MONOCYTES ABSOLUTE: 0.6 K/UL (ref 0–1.3)
MONOCYTES RELATIVE PERCENT: 6.3 %
NEUTROPHILS ABSOLUTE: 7.7 K/UL (ref 1.7–7.7)
NEUTROPHILS RELATIVE PERCENT: 80.3 %
OPIATE SCREEN URINE: NORMAL
OXYCODONE URINE: NORMAL
PDW BLD-RTO: 15.2 % (ref 12.4–15.4)
PH UA: 6
PHENCYCLIDINE SCREEN URINE: NORMAL
PLATELET # BLD: 143 K/UL (ref 135–450)
PMV BLD AUTO: 9.7 FL (ref 5–10.5)
PROPOXYPHENE SCREEN: NORMAL
RBC # BLD: 4.27 M/UL (ref 4–5.2)
SARS-COV-2, NAAT: NOT DETECTED
TOTAL SYPHILLIS IGG/IGM: NORMAL
WBC # BLD: 9.6 K/UL (ref 4–11)

## 2021-04-20 PROCEDURE — 87635 SARS-COV-2 COVID-19 AMP PRB: CPT

## 2021-04-20 PROCEDURE — 2580000003 HC RX 258: Performed by: OBSTETRICS & GYNECOLOGY

## 2021-04-20 PROCEDURE — 51701 INSERT BLADDER CATHETER: CPT

## 2021-04-20 PROCEDURE — 3700000025 EPIDURAL BLOCK: Performed by: ANESTHESIOLOGY

## 2021-04-20 PROCEDURE — 2500000003 HC RX 250 WO HCPCS: Performed by: NURSE ANESTHETIST, CERTIFIED REGISTERED

## 2021-04-20 PROCEDURE — 1220000000 HC SEMI PRIVATE OB R&B

## 2021-04-20 PROCEDURE — 85025 COMPLETE CBC W/AUTO DIFF WBC: CPT

## 2021-04-20 PROCEDURE — 86901 BLOOD TYPING SEROLOGIC RH(D): CPT

## 2021-04-20 PROCEDURE — 86900 BLOOD TYPING SEROLOGIC ABO: CPT

## 2021-04-20 PROCEDURE — 86880 COOMBS TEST DIRECT: CPT

## 2021-04-20 PROCEDURE — 86870 RBC ANTIBODY IDENTIFICATION: CPT

## 2021-04-20 PROCEDURE — 80307 DRUG TEST PRSMV CHEM ANLYZR: CPT

## 2021-04-20 PROCEDURE — 86850 RBC ANTIBODY SCREEN: CPT

## 2021-04-20 PROCEDURE — 86780 TREPONEMA PALLIDUM: CPT

## 2021-04-20 PROCEDURE — 2580000003 HC RX 258: Performed by: NURSE ANESTHETIST, CERTIFIED REGISTERED

## 2021-04-20 PROCEDURE — 6360000002 HC RX W HCPCS: Performed by: OBSTETRICS & GYNECOLOGY

## 2021-04-20 RX ORDER — DOCUSATE SODIUM 100 MG/1
100 CAPSULE, LIQUID FILLED ORAL 2 TIMES DAILY
Status: DISCONTINUED | OUTPATIENT
Start: 2021-04-20 | End: 2021-04-20

## 2021-04-20 RX ORDER — TERBUTALINE SULFATE 1 MG/ML
0.25 INJECTION, SOLUTION SUBCUTANEOUS ONCE
Status: DISCONTINUED | OUTPATIENT
Start: 2021-04-20 | End: 2021-04-21

## 2021-04-20 RX ORDER — ONDANSETRON 2 MG/ML
4 INJECTION INTRAMUSCULAR; INTRAVENOUS EVERY 6 HOURS PRN
Status: DISCONTINUED | OUTPATIENT
Start: 2021-04-20 | End: 2021-04-21

## 2021-04-20 RX ORDER — SODIUM CHLORIDE, SODIUM LACTATE, POTASSIUM CHLORIDE, CALCIUM CHLORIDE 600; 310; 30; 20 MG/100ML; MG/100ML; MG/100ML; MG/100ML
INJECTION, SOLUTION INTRAVENOUS CONTINUOUS
Status: DISCONTINUED | OUTPATIENT
Start: 2021-04-20 | End: 2021-04-21

## 2021-04-20 RX ORDER — LIDOCAINE HYDROCHLORIDE 10 MG/ML
30 INJECTION, SOLUTION EPIDURAL; INFILTRATION; INTRACAUDAL; PERINEURAL PRN
Status: DISCONTINUED | OUTPATIENT
Start: 2021-04-20 | End: 2021-04-21

## 2021-04-20 RX ORDER — HYDROCODONE BITARTRATE AND ACETAMINOPHEN 5; 325 MG/1; MG/1
2 TABLET ORAL EVERY 4 HOURS PRN
Status: DISCONTINUED | OUTPATIENT
Start: 2021-04-20 | End: 2021-04-21

## 2021-04-20 RX ORDER — SODIUM CHLORIDE 9 MG/ML
25 INJECTION, SOLUTION INTRAVENOUS PRN
Status: DISCONTINUED | OUTPATIENT
Start: 2021-04-20 | End: 2021-04-21

## 2021-04-20 RX ORDER — ACETAMINOPHEN 325 MG/1
650 TABLET ORAL EVERY 4 HOURS PRN
Status: DISCONTINUED | OUTPATIENT
Start: 2021-04-20 | End: 2021-04-21

## 2021-04-20 RX ORDER — IBUPROFEN 800 MG/1
800 TABLET ORAL EVERY 8 HOURS SCHEDULED
Status: DISCONTINUED | OUTPATIENT
Start: 2021-04-20 | End: 2021-04-20

## 2021-04-20 RX ORDER — SODIUM CHLORIDE 0.9 % (FLUSH) 0.9 %
10 SYRINGE (ML) INJECTION PRN
Status: DISCONTINUED | OUTPATIENT
Start: 2021-04-20 | End: 2021-04-21

## 2021-04-20 RX ORDER — BUTORPHANOL TARTRATE 1 MG/ML
1 INJECTION, SOLUTION INTRAMUSCULAR; INTRAVENOUS
Status: DISCONTINUED | OUTPATIENT
Start: 2021-04-20 | End: 2021-04-21

## 2021-04-20 RX ORDER — SIMETHICONE 80 MG
80 TABLET,CHEWABLE ORAL EVERY 6 HOURS PRN
Status: DISCONTINUED | OUTPATIENT
Start: 2021-04-20 | End: 2021-04-21

## 2021-04-20 RX ORDER — HYDROCODONE BITARTRATE AND ACETAMINOPHEN 5; 325 MG/1; MG/1
1 TABLET ORAL EVERY 4 HOURS PRN
Status: DISCONTINUED | OUTPATIENT
Start: 2021-04-20 | End: 2021-04-21

## 2021-04-20 RX ORDER — DIPHENHYDRAMINE HYDROCHLORIDE 50 MG/ML
25 INJECTION INTRAMUSCULAR; INTRAVENOUS EVERY 4 HOURS PRN
Status: DISCONTINUED | OUTPATIENT
Start: 2021-04-20 | End: 2021-04-21

## 2021-04-20 RX ORDER — BUPIVACAINE HYDROCHLORIDE 2.5 MG/ML
INJECTION, SOLUTION EPIDURAL; INFILTRATION; INTRACAUDAL PRN
Status: DISCONTINUED | OUTPATIENT
Start: 2021-04-20 | End: 2021-04-21 | Stop reason: SDUPTHER

## 2021-04-20 RX ORDER — SODIUM CHLORIDE 0.9 % (FLUSH) 0.9 %
5-40 SYRINGE (ML) INJECTION EVERY 12 HOURS SCHEDULED
Status: DISCONTINUED | OUTPATIENT
Start: 2021-04-20 | End: 2021-04-21

## 2021-04-20 RX ADMIN — ONDANSETRON 4 MG: 2 INJECTION INTRAMUSCULAR; INTRAVENOUS at 23:22

## 2021-04-20 RX ADMIN — DEXMEDETOMIDINE HYDROCHLORIDE 30 MCG: 100 INJECTION, SOLUTION INTRAVENOUS at 18:42

## 2021-04-20 RX ADMIN — Medication 15 ML/HR: at 18:00

## 2021-04-20 RX ADMIN — SODIUM CHLORIDE, POTASSIUM CHLORIDE, SODIUM LACTATE AND CALCIUM CHLORIDE: 600; 310; 30; 20 INJECTION, SOLUTION INTRAVENOUS at 17:47

## 2021-04-20 RX ADMIN — Medication 1 MILLI-UNITS/MIN: at 09:00

## 2021-04-20 RX ADMIN — SODIUM CHLORIDE, POTASSIUM CHLORIDE, SODIUM LACTATE AND CALCIUM CHLORIDE: 600; 310; 30; 20 INJECTION, SOLUTION INTRAVENOUS at 16:12

## 2021-04-20 RX ADMIN — BUPIVACAINE HYDROCHLORIDE 6 ML: 2.5 INJECTION, SOLUTION EPIDURAL; INFILTRATION; INTRACAUDAL; PERINEURAL at 18:42

## 2021-04-20 RX ADMIN — BUPIVACAINE HYDROCHLORIDE 6 ML: 2.5 INJECTION, SOLUTION EPIDURAL; INFILTRATION; INTRACAUDAL; PERINEURAL at 17:55

## 2021-04-20 RX ADMIN — SODIUM CHLORIDE, POTASSIUM CHLORIDE, SODIUM LACTATE AND CALCIUM CHLORIDE: 600; 310; 30; 20 INJECTION, SOLUTION INTRAVENOUS at 07:41

## 2021-04-20 NOTE — ANESTHESIA PROCEDURE NOTES
Epidural Block    Patient location during procedure: OB  Start time: 4/20/2021 5:46 PM  End time: 4/20/2021 6:03 PM  Reason for block: labor epidural  Staffing  Performed: resident/CRNA   Anesthesiologist: Ike Augustin MD  Resident/CRNA: JOE Lai CRNA  Preanesthetic Checklist  Completed: patient identified, IV checked, risks and benefits discussed, monitors and equipment checked, pre-op evaluation, timeout performed, anesthesia consent given, oxygen available and patient being monitored  Epidural  Patient position: sitting  Prep: ChloraPrep and site prepped and draped  Patient monitoring: cardiac monitor, continuous pulse ox and frequent blood pressure checks  Approach: midline  Location: lumbar (1-5)  Injection technique: REN air  Provider prep: mask and sterile gloves  Needle  Needle type: Tuohy   Needle gauge: 17 G  Needle length: 3.5 in  Needle insertion depth: 5 cm  Catheter type: side hole  Catheter size: 19 G (20 G)  Catheter at skin depth: 12 cm  Test dose: negative (3ml 1.5% lido with epi)  Assessment  Sensory level: T8  Hemodynamics: stable  Attempts: 1  Additional Notes  DPE:  25 gauge pencil point needle through touhy X1. Positive CSF.

## 2021-04-20 NOTE — PROGRESS NOTES
2231 [facility] at bedside for epidural placement    1751: Epidural placed at this time. 1752: Test dose given.

## 2021-04-20 NOTE — ANESTHESIA PRE PROCEDURE
Department of Anesthesiology  Preprocedure Note       Name:  Jet Alvarado   Age:  24 y.o.  :  2000                                          MRN:  [id number]         Date:  2021      Surgeon: * No surgeons listed *    Procedure: * No procedures listed *    Medications prior to admission:   Prior to Admission medications    Medication Sig Start Date End Date Taking?  Authorizing Provider   Prenatal Vit-Fe Fumarate-FA (PRENATAL 1+1 PO) Take 1 tablet by mouth daily   Yes Historical Provider, MD   ondansetron (ZOFRAN-ODT) 4 MG disintegrating tablet Take 1 tablet by mouth every 8 hours as needed for Nausea  Patient not taking: Reported on 20   Onofre Hayes MD   pyridoxine (B-6) 25 MG tablet Take 1 tablet by mouth 2 times daily as needed (nausea)  Patient not taking: Reported on 10/14/2020 9/4/20   Onofre Hayes MD   doxyLAMINE succinate (GNP SLEEP AID) 25 MG tablet Take 0.5 tablets by mouth nightly as needed (nausea)  Patient not taking: Reported on 10/14/2020 9/4/20   Onofre Hayes MD   norethindrone (ORTHO MICRONOR) 0.35 MG tablet Take 1 tablet by mouth daily  Patient not taking: Reported on 10/14/2020 6/5/20   Onofre Haeys MD   ibuprofen (ADVIL;MOTRIN) 800 MG tablet Take 1 tablet by mouth every 8 hours as needed for Pain  Patient not taking: Reported on 10/14/2020 5/26/20   Onofre Hayes MD       Current medications:    Current Facility-Administered Medications   Medication Dose Route Frequency Provider Last Rate Last Admin    lactated ringers infusion   Intravenous Continuous Onofre Hayes MD        terbutaline (BRETHINE) injection 0.25 mg  0.25 mg Subcutaneous Once Onofre Hayes MD        lactated ringers infusion   Intravenous Continuous Onofre Hayes  mL/hr at 21 1747 New Bag at 21 174    sodium chloride flush 0.9 % injection 5-40 mL  5-40 mL Intravenous 2 times per day Onofre Hayes MD        sodium chloride flush 0.9 % injection 10 mL  10 mL Intravenous PRN Elena Jackson MD        0.9 % sodium chloride infusion  25 mL Intravenous PRN Elena Jackson MD        lidocaine PF 1 % injection 30 mL  30 mL Other PRN Elena Jackson MD        [facility]ron [facility]) injection 4 mg  4 mg Intravenous Q6H PRN Elena Jackson MD        diphenhydrAMINE (BENADRYL) injection 25 mg  25 mg Intravenous Q4H PRN Elena Jackson MD        acetaminophen (TYLENOL) tablet 650 mg  650 mg Oral Q4H PRN Elena Jackson MD        HYDROcodone-acetaminophen (NORCO) 5-325 MG per tablet 1 tablet  1 tablet Oral Q4H PRN Elena Jackson MD        Or   Balderrama HYDROcodone-acetaminophen (NORCO) 5-325 MG per tablet 2 tablet  2 tablet Oral Q4H PRN Elena Jackson MD       Balderrama witch hazel-glycerin (TUCKS) pad   Topical PRN Elena Jackson MD        benzocaine-menthol (DERMOPLAST) 20-0.5 % spray   Topical PRN Elena Jackson MD        simethicone (MYLICON) chewable tablet 80 mg  80 mg Oral Q6H PRN Elena Jackson MD        butorphanol (STADOL) injection 1 mg  1 mg Intravenous Q3H PRN Elena Jackson MD        oxytocin (PITOCIN) 30 units in 500 mL infusion  1-20 monica-units/min Intravenous Continuous Elena Jackson MD 10 mL/hr at 21 1700 10 monica-units/min at 21 1700    sodium chloride 0.9 % 200 mL with fentaNYL 500 mcg, bupivacaine 0.5% 50 mL (OB) epidural                Allergies:  No Known Allergies    Problem List:    Patient Active Problem List   Diagnosis Code    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 8XX0    Vaginal discharge in pregnancy in third trimester O26.893, N89.8    Encounter for induction of labor Z34.90       Past Medical History:        Diagnosis Date    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 2/17/2021    reports seeing specialist and specialist not concerned       Past Surgical History:        Procedure Laterality Date    DILATION AND CURETTAGE      DILATION AND CURETTAGE OF UTERUS N/A 5/26/2020    SUCTION DILATATION AND CURETTAGE performed by Ana María Hummel MD at [place] [address] History:    Social History     Tobacco Use    Smoking status: Never Smoker    Smokeless tobacco: Never Used   Substance Use Topics    Alcohol use: No                                Counseling given: Not Answered      Vital Signs (Current):   Vitals:    04/20/21 1600 04/20/21 1700 04/20/21 1755 04/20/21 1758   BP: 128/77 (!) 135/94 133/75 130/77   Pulse: 83 (!) 112 (!) 123 (!) 118   Resp: 16 16 16 16   Temp:  37.3 °C (99.2 °F)     TempSrc:  Oral     Weight:       Height:                                                  BP Readings from Last 3 Encounters:   04/20/21 130/77   04/19/21 110/82   04/14/21 114/70       NPO Status: Time of last liquid consumption: 0500                        Time of last solid consumption: 0500                        Date of last liquid consumption: 04/20/21                        Date of last solid food consumption: 04/20/21    BMI:   Wt Readings from Last 3 Encounters:   04/20/21 156 lb (70.8 kg) (85 %, Z= 1.03)*   04/19/21 156 lb (70.8 kg) (85 %, Z= 1.03)*   04/14/21 148 lb (67.1 kg) (78 %, Z= 0.79)*     * Growth percentiles are based on CDC (Girls, 2-20 Years) data. Body mass index is 25.96 kg/m².     CBC:   Lab Results   Component Value Date    WBC 9.6 04/20/2021    RBC 4.27 04/20/2021    HGB 12.4 04/20/2021    HCT 37.0 04/20/2021    MCV 86.7 04/20/2021    RDW 15.2 04/20/2021     04/20/2021       CMP:   Lab Results   Component Value Date     05/26/2020    K 3.9 05/26/2020     05/26/2020    CO2 25 05/26/2020    BUN 8 05/26/2020    CREATININE <0.5 05/26/2020    GFRAA >60 05/26/2020    LABGLOM >60 05/26/2020    GLUCOSE 84 05/26/2020    CALCIUM 9.2 05/26/2020       POC Tests: No results for input(s): POCGLU, POCNA, POCK, POCCL, POCBUN, POCHEMO, POCHCT in the last 72 hours. Coags: No results found for: PROTIME, INR, APTT    HCG (If Applicable):   Lab Results   Component Value Date    PREGTESTUR Positive 09/04/2020        ABGs: No results found for: PHART, PO2ART, POA7DVG, HGX5DMG, BEART, F8YLLCHA     Type & Screen (If Applicable):  No results found for: LABABO, LABRH    Drug/Infectious Status (If Applicable):  No results found for: HIV, HEPCAB    COVID-19 Screening (If Applicable):   Lab Results   Component Value Date    COVID19 Not Detected 04/20/2021    COVID19 Not Detected 05/25/2020           Anesthesia Evaluation  Patient summary reviewed and Nursing notes reviewed no history of anesthetic complications:   Airway: Mallampati: II        Dental:          Pulmonary:Negative Pulmonary ROS                              Cardiovascular:Negative CV ROS                      Neuro/Psych:   Negative Neuro/Psych ROS              GI/Hepatic/Renal: Neg GI/Hepatic/Renal ROS            Endo/Other: Negative Endo/Other ROS                    Abdominal:           Vascular: negative vascular ROS. Anesthesia Plan      epidural     ASA 2     (Benefits and risks of EMILY were discussed and agreed upon. All questions were answered, and verbal consent was obtained.)        Anesthetic plan and risks discussed with patient.                       JOE Lindsey - CRNA   4/20/2021

## 2021-04-20 NOTE — H&P
Obstetrics Admission History and Physical    CC:   Chief Complaint   Patient presents with    Scheduled Induction       HPI: Lucho Perez is a 24 y.o.  at 39w0d who presents for scheduled induction of labor. Doing well today, no complaints. Did have some contractions last night after check in office, no VB or LOF. +FM. Otherwise feeling well this morning. Pregnancy has been complicated dilated fetal kidneys (see MFM notes, elective  US recommended), Rh negative status, and history of molar pregnancy. Review of Systems: The following ROS was otherwise negative, except as noted in the HPI: constitutional, HEENT, respiratory, cardiovascular, gastrointestinal, genitourinary, skin, musculoskeletal, neurological, psych. OBGYN Provider : Melissa Weiss    Obstetrical History:  OB History    Para Term  AB Living   2 0 0 0 1 0   SAB TAB Ectopic Molar Multiple Live Births   0 0 0 1 0 0      # Outcome Date GA Lbr Lio/2nd Weight Sex Delivery Anes PTL Lv   2 Current            1 Molar 20               Past Medical History:   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 2021    reports seeing specialist and specialist not concerned       Medications:  No current facility-administered medications on file prior to encounter.       Current Outpatient Medications on File Prior to Encounter   Medication Sig Dispense Refill    Prenatal Vit-Fe Fumarate-FA (PRENATAL 1+1 PO) Take 1 tablet by mouth daily      ondansetron (ZOFRAN-ODT) 4 MG disintegrating tablet Take 1 tablet by mouth every 8 hours as needed for Nausea (Patient not taking: Reported on 2020) 20 tablet 1    pyridoxine (B-6) 25 MG tablet Take 1 tablet by mouth 2 times daily as needed (nausea) (Patient not taking: Reported on 10/14/2020) 30 tablet 1    doxyLAMINE succinate (GNP SLEEP AID) 25 MG tablet Take 0.5 tablets by mouth nightly as needed (nausea) (Patient not taking: Reported on 10/14/2020) 14 tablet 0    norethindrone (ORTHO MICRONOR) 0.35 MG tablet Take 1 tablet by mouth daily (Patient not taking: Reported on 10/14/2020) 28 tablet 3    ibuprofen (ADVIL;MOTRIN) 800 MG tablet Take 1 tablet by mouth every 8 hours as needed for Pain (Patient not taking: Reported on 10/14/2020) 20 tablet 1       Allergies:  Patient has no known allergies. Surgical History:  Past Surgical History:   Procedure Laterality Date    DILATION AND CURETTAGE      DILATION AND CURETTAGE OF UTERUS N/A 5/26/2020    SUCTION DILATATION AND CURETTAGE performed by Reji Cordova MD at [id number] [place] [address] OR       Family History:  Family History   Problem Relation Age of Onset    Diabetes Paternal Grandfather     Stroke Paternal Grandfather     Diabetes Maternal Grandmother     Ovarian Cancer Father     Ovarian Cancer Mother     No Known Problems Brother     No Known Problems Sister     No Known Problems Sister     No Known Problems Sister        Social History:  Social History     Substance and Sexual Activity   Alcohol Use No     Social History     Substance and Sexual Activity   Drug Use No     Social History     Tobacco Use   Smoking Status Never Smoker   Smokeless Tobacco Never Used       Physical Exam:  BP (!) 140/83   Pulse 95   Temp 98.1 °F (36.7 °C) (Oral)   Resp 16   Ht 5' 5\" (1.651 m)   Wt 156 lb (70.8 kg)   LMP 07/21/2020 (Exact Date)   BMI 25.96 kg/m²   Physical Exam  Constitutional:       Appearance: Normal appearance. HENT:      Head: Normocephalic. Cardiovascular:      Rate and Rhythm: Normal rate. Pulmonary:      Effort: Pulmonary effort is normal. No respiratory distress. Abdominal:      Palpations: Abdomen is soft. Tenderness: There is no abdominal tenderness. There is no guarding or rebound. Skin:     General: Skin is warm and dry. Neurological:      General: No focal deficit present. Mental Status: She is alert.    Psychiatric:         Mood and Affect: Mood normal. Cervix: 3/50/-2    Fetal Heart Monitor Interpretation:   FHT: 150 bpm, moderate variability, + accels, no decels  [place]: quiet    Labs:  Admission on 2021   Component Date Value    WBC 2021 9.6     RBC 2021 4.27     Hemoglobin 2021 12.4     Hematocrit 2021 37.0     MCV 2021 86.7     MCH 2021 29.1     MCHC 2021 33.6     RDW 2021 15.2     Platelets  143     MPV 2021 9.7     Neutrophils % 2021 80.3     Lymphocytes % 2021 12.5     Monocytes % 2021 6.3     Eosinophils % 2021 0.5     Basophils % 2021 0.4     Neutrophils Absolute 2021 7.7     Lymphocytes Absolute 2021 1.2     Monocytes Absolute 2021 0.6     Eosinophils Absolute 2021 0.0     Basophils Absolute 2021 0.0     pH, UA 2021 6.0     Drug Screen Comment: 2021 see below        Assessment/Plan:   24 y.o.  at 39w0d here for induction of labor    1. FWB-reasuring  -cEFM    2. IOL  - start pitocin, titrate per protocol  - recheck in 3-4hr or PRN pain/pressure  - IV stadol or epidural PRN    3. Fetal renal anomaly  - dilated kidneys on US, saw MFM, suspect normal variant  - elective  renal US recommended    4.  A-/ab-, RI, GBS neg  - PP rhogam work-up    Dispo: admit for labor, delivery, and postpartum care  Jaylan Gage MD

## 2021-04-20 NOTE — PROGRESS NOTES
Updated Dr. Delmy Puentes via telephone that house officer is unavailable to AROM pt at this time. Pt is cramping and rating contractions a 6/10 but states they are tolerable. Around 1200, pt stated she felt a small gush of fluid. RN checked pad and had pt cough and noted no fluid. Around 1510, pt stated she had some \"yellow fluid in her underwear. \" Fluid appears to meconium stained, but was a very scant amount. RN will continue to monitor and see if more fluid comes out. Dr. Delmy Puentes will be at bedside in a little over an hour to check pt and discuss plan of care.

## 2021-04-20 NOTE — PROGRESS NOTES
Pt placed on EFM at this time. Pt denies leaking of any fluid or vaginal bleeding. Pt reports feeling infant moving today. Pt reports feeling sporadic contractions over the last week.

## 2021-04-20 NOTE — PROGRESS NOTES
Dr. Wilber Seaman at bedside to evaluate pt. SVE performed. Pt is 3/60%/-2. Membranes are still intact. Plan is to start pitocin.

## 2021-04-20 NOTE — PROGRESS NOTES
Labor Progress Note    Subjective:   Pt seen and examined. Doing well, no concerns/complaints. Comfortable now with epidural, is feeling pain still in just one spot. +FM, continues to leak clear fluid. Objective:  /67   Pulse 88   Temp 98.3 °F (36.8 °C) (Oral)   Resp 16   Ht 5' 5\" (1.651 m)   Wt 156 lb (70.8 kg)   LMP 07/21/2020 (Exact Date)   BMI 25.96 kg/m²   Cervix:  Dilation (cm): 4  Effacement (%): 90  Station: -1  OB Examiner: Dr. Glenn Dave    Fetal Monitoring Interpretation   FHTs: 135 baseline, mod malina, +accels, occ early decels  [place]: q2-3 min, pitocin at 10mU    Infusions:    lactated ringers      lactated ringers 125 mL/hr at 04/20/21 1747    sodium chloride      oxytocin 10 monica-units/min (04/20/21 1900)     ? Assessment/Plan:  1. Intrapartum  - FHTs reassuring   - Continue expectant mgmt.  Titrate pitocin per protocol  - recheck in 4hr or PRN pain/pressure    Ethan Ballard MD

## 2021-04-20 NOTE — PROGRESS NOTES
Updated Dr. Aminata Ramachandran via telephone. Dr. Amianta Ramachandran would like RN to check pt and call back with update. Pt karissa every 2-5 minutes. Pitocin is at 8 milliunits/hr. Pt is rating contractions a 5/10 at this time.

## 2021-04-20 NOTE — PROGRESS NOTES
Dr. Martín David in department and discussed FHR tracing, ctx pattern and plan of care for patient. Will monitor.

## 2021-04-20 NOTE — PLAN OF CARE
Problem: Anxiety:  Goal: Level of anxiety will decrease  Description: Level of anxiety will decrease  4/20/2021 1941 by Raul Marshall RN  Outcome: Ongoing  4/20/2021 0827 by Ousmane Terry RN  Outcome: Ongoing     Problem: Breathing Pattern - Ineffective:  Goal: Able to breathe comfortably  Description: Able to breathe comfortably  4/20/2021 1941 by Raul Marshall RN  Outcome: Ongoing  4/20/2021 0827 by Ousmane Terry RN  Outcome: Ongoing     Problem: Fluid Volume - Imbalance:  Goal: Absence of imbalanced fluid volume signs and symptoms  Description: Absence of imbalanced fluid volume signs and symptoms  4/20/2021 1941 by Raul Marshall RN  Outcome: Ongoing  4/20/2021 0827 by Ousmane Terry RN  Outcome: Ongoing  Goal: Absence of intrapartum hemorrhage signs and symptoms  Description: Absence of intrapartum hemorrhage signs and symptoms  4/20/2021 1941 by Raul Marshall RN  Outcome: Ongoing  4/20/2021 0827 by Ousmane Terry RN  Outcome: Ongoing     Problem: Infection - Intrapartum Infection:  Goal: Will show no infection signs and symptoms  Description: Will show no infection signs and symptoms  4/20/2021 1941 by Raul Marshall RN  Outcome: Ongoing  4/20/2021 0827 by Ousmane Terry RN  Outcome: Ongoing     Problem: Labor Process - Prolonged:  Goal: Labor progression, first stage, within specified pattern  Description: Labor progression, first stage, within specified pattern  4/20/2021 1941 by Raul Marshall RN  Outcome: Ongoing  4/20/2021 0827 by Ousmane Terry RN  Outcome: Ongoing  Goal: Labor progession, second stage, within specified pattern  Description: Labor progession, second stage, within specified pattern  4/20/2021 1941 by Raul Marshall RN  Outcome: Ongoing  4/20/2021 0827 by Ousmane Terry RN  Outcome: Ongoing  Goal: Uterine contractions within specified parameters  Description: Uterine contractions within specified parameters  4/20/2021 1941 by Cesilia Pineda Tonette Galeazzi, RN  Outcome: Ongoing  2021 by Vanetta Sicard, RN  Outcome: Ongoing     Problem:  Screening:  Goal: Ability to make informed decisions regarding treatment has improved  Description: Ability to make informed decisions regarding treatment has improved  2021 by Jimy Quiroga RN  Outcome: Ongoing  2021 by Vanetta Sicard, RN  Outcome: Ongoing     Problem: Pain - Acute:  Goal: Pain level will decrease  Description: Pain level will decrease  2021 by Jimy Quiroga RN  Outcome: Ongoing  2021 by Vanetta Sicard, RN  Outcome: Ongoing  Goal: Able to cope with pain  Description: Able to cope with pain  2021 by Jimy Quiroga RN  Outcome: Ongoing  2021 by Vanetta Sicard, RN  Outcome: Ongoing     Problem: Tissue Perfusion - Uteroplacental, Altered:  Goal: Absence of abnormal fetal heart rate pattern  Description: Absence of abnormal fetal heart rate pattern  2021 by Jimy Quiroga RN  Outcome: Ongoing  2021 by Vanetta Sicard, RN  Outcome: Ongoing     Problem: Urinary Retention:  Goal: Experiences of bladder distention will decrease  Description: Experiences of bladder distention will decrease  2021 by Jimy Quiroga RN  Outcome: Ongoing  2021 by Vanetta Sicard, RN  Outcome: Ongoing  Goal: Urinary elimination within specified parameters  Description: Urinary elimination within specified parameters  2021 by Jimy Quiroga RN  Outcome: Ongoing  2021 by Vanetta Sicard, RN  Outcome: Ongoing     Problem: Pain:  Goal: Pain level will decrease  Description: Pain level will decrease  2021 by Jimy Quiroga RN  Outcome: Ongoing  2021 by Vanetta Sicard, RN  Outcome: Ongoing  Goal: Control of acute pain  Description: Control of acute pain  Outcome: Ongoing  Goal: Control of chronic pain  Description: Control of chronic pain  Outcome:

## 2021-04-21 PROCEDURE — 6360000002 HC RX W HCPCS: Performed by: OBSTETRICS & GYNECOLOGY

## 2021-04-21 PROCEDURE — 0HQ9XZZ REPAIR PERINEUM SKIN, EXTERNAL APPROACH: ICD-10-PCS | Performed by: OBSTETRICS & GYNECOLOGY

## 2021-04-21 PROCEDURE — 1220000000 HC SEMI PRIVATE OB R&B

## 2021-04-21 PROCEDURE — 6370000000 HC RX 637 (ALT 250 FOR IP): Performed by: OBSTETRICS & GYNECOLOGY

## 2021-04-21 PROCEDURE — 7200000001 HC VAGINAL DELIVERY

## 2021-04-21 PROCEDURE — 51701 INSERT BLADDER CATHETER: CPT

## 2021-04-21 PROCEDURE — 59400 OBSTETRICAL CARE: CPT | Performed by: OBSTETRICS & GYNECOLOGY

## 2021-04-21 PROCEDURE — 2580000003 HC RX 258: Performed by: OBSTETRICS & GYNECOLOGY

## 2021-04-21 RX ORDER — SIMETHICONE 80 MG
80 TABLET,CHEWABLE ORAL 4 TIMES DAILY PRN
Status: DISCONTINUED | OUTPATIENT
Start: 2021-04-21 | End: 2021-04-23 | Stop reason: HOSPADM

## 2021-04-21 RX ORDER — SODIUM CHLORIDE 0.9 % (FLUSH) 0.9 %
10 SYRINGE (ML) INJECTION PRN
Status: DISCONTINUED | OUTPATIENT
Start: 2021-04-21 | End: 2021-04-23 | Stop reason: HOSPADM

## 2021-04-21 RX ORDER — ACETAMINOPHEN 325 MG/1
650 TABLET ORAL EVERY 4 HOURS PRN
Status: DISCONTINUED | OUTPATIENT
Start: 2021-04-21 | End: 2021-04-23 | Stop reason: HOSPADM

## 2021-04-21 RX ORDER — FERROUS SULFATE 325(65) MG
325 TABLET ORAL
Status: DISCONTINUED | OUTPATIENT
Start: 2021-04-21 | End: 2021-04-23 | Stop reason: HOSPADM

## 2021-04-21 RX ORDER — HYDROCODONE BITARTRATE AND ACETAMINOPHEN 5; 325 MG/1; MG/1
2 TABLET ORAL EVERY 4 HOURS PRN
Status: DISCONTINUED | OUTPATIENT
Start: 2021-04-21 | End: 2021-04-23 | Stop reason: HOSPADM

## 2021-04-21 RX ORDER — ONDANSETRON 4 MG/1
4 TABLET, ORALLY DISINTEGRATING ORAL EVERY 8 HOURS PRN
Status: DISCONTINUED | OUTPATIENT
Start: 2021-04-21 | End: 2021-04-23 | Stop reason: HOSPADM

## 2021-04-21 RX ORDER — SODIUM CHLORIDE, SODIUM LACTATE, POTASSIUM CHLORIDE, CALCIUM CHLORIDE 600; 310; 30; 20 MG/100ML; MG/100ML; MG/100ML; MG/100ML
INJECTION, SOLUTION INTRAVENOUS CONTINUOUS
Status: DISCONTINUED | OUTPATIENT
Start: 2021-04-21 | End: 2021-04-23 | Stop reason: HOSPADM

## 2021-04-21 RX ORDER — LANOLIN 100 %
OINTMENT (GRAM) TOPICAL PRN
Status: DISCONTINUED | OUTPATIENT
Start: 2021-04-21 | End: 2021-04-23 | Stop reason: HOSPADM

## 2021-04-21 RX ORDER — IBUPROFEN 800 MG/1
800 TABLET ORAL EVERY 8 HOURS PRN
Status: DISCONTINUED | OUTPATIENT
Start: 2021-04-21 | End: 2021-04-23 | Stop reason: HOSPADM

## 2021-04-21 RX ORDER — SODIUM CHLORIDE 9 MG/ML
25 INJECTION, SOLUTION INTRAVENOUS PRN
Status: DISCONTINUED | OUTPATIENT
Start: 2021-04-21 | End: 2021-04-23 | Stop reason: HOSPADM

## 2021-04-21 RX ORDER — DOCUSATE SODIUM 100 MG/1
100 CAPSULE, LIQUID FILLED ORAL 2 TIMES DAILY
Status: DISCONTINUED | OUTPATIENT
Start: 2021-04-21 | End: 2021-04-23 | Stop reason: HOSPADM

## 2021-04-21 RX ORDER — HYDROCODONE BITARTRATE AND ACETAMINOPHEN 5; 325 MG/1; MG/1
1 TABLET ORAL EVERY 4 HOURS PRN
Status: DISCONTINUED | OUTPATIENT
Start: 2021-04-21 | End: 2021-04-23 | Stop reason: HOSPADM

## 2021-04-21 RX ORDER — SODIUM CHLORIDE 0.9 % (FLUSH) 0.9 %
10 SYRINGE (ML) INJECTION EVERY 12 HOURS SCHEDULED
Status: DISCONTINUED | OUTPATIENT
Start: 2021-04-21 | End: 2021-04-23 | Stop reason: HOSPADM

## 2021-04-21 RX ADMIN — HYDROCODONE BITARTRATE AND ACETAMINOPHEN 1 TABLET: 5; 325 TABLET ORAL at 21:43

## 2021-04-21 RX ADMIN — ACETAMINOPHEN 650 MG: 325 TABLET ORAL at 13:45

## 2021-04-21 RX ADMIN — SODIUM CHLORIDE, POTASSIUM CHLORIDE, SODIUM LACTATE AND CALCIUM CHLORIDE: 600; 310; 30; 20 INJECTION, SOLUTION INTRAVENOUS at 01:12

## 2021-04-21 RX ADMIN — SODIUM CHLORIDE, POTASSIUM CHLORIDE, SODIUM LACTATE AND CALCIUM CHLORIDE: 600; 310; 30; 20 INJECTION, SOLUTION INTRAVENOUS at 09:08

## 2021-04-21 RX ADMIN — ACETAMINOPHEN 650 MG: 325 TABLET ORAL at 19:33

## 2021-04-21 RX ADMIN — Medication 166.7 ML: at 07:42

## 2021-04-21 RX ADMIN — DOCUSATE SODIUM 100 MG: 100 CAPSULE, LIQUID FILLED ORAL at 10:20

## 2021-04-21 RX ADMIN — IBUPROFEN 800 MG: 800 TABLET, FILM COATED ORAL at 17:08

## 2021-04-21 RX ADMIN — DOCUSATE SODIUM 100 MG: 100 CAPSULE, LIQUID FILLED ORAL at 19:33

## 2021-04-21 RX ADMIN — HYDROCODONE BITARTRATE AND ACETAMINOPHEN 1 TABLET: 5; 325 TABLET ORAL at 22:38

## 2021-04-21 RX ADMIN — IBUPROFEN 800 MG: 800 TABLET, FILM COATED ORAL at 09:05

## 2021-04-21 RX ADMIN — Medication 87.3 MILLI-UNITS/MIN: at 07:53

## 2021-04-21 ASSESSMENT — PAIN SCALES - GENERAL
PAINLEVEL_OUTOF10: 7
PAINLEVEL_OUTOF10: 4
PAINLEVEL_OUTOF10: 6
PAINLEVEL_OUTOF10: 6

## 2021-04-21 NOTE — PLAN OF CARE
Problem: Discharge Planning:  Goal: Discharged to appropriate level of care  Description: Discharged to appropriate level of care  4/21/2021 1819 by Rahul Tony RN  Outcome: Ongoing  4/21/2021 1110 by Elicia David RN  Outcome: Ongoing     Problem: Constipation:  Goal: Bowel elimination is within specified parameters  Description: Bowel elimination is within specified parameters  4/21/2021 1819 by Rahul Tony RN  Outcome: Ongoing  4/21/2021 1110 by Elicia David RN  Outcome: Ongoing     Problem: Fluid Volume - Imbalance:  Goal: Absence of imbalanced fluid volume signs and symptoms  Description: Absence of imbalanced fluid volume signs and symptoms  4/21/2021 1819 by Rahul Tony RN  Outcome: Ongoing  4/21/2021 1110 by Elicia David RN  Outcome: Ongoing  Goal: Absence of postpartum hemorrhage signs and symptoms  Description: Absence of postpartum hemorrhage signs and symptoms  4/21/2021 1819 by Rahul Tony RN  Outcome: Ongoing  4/21/2021 1110 by Elicia David RN  Outcome: Ongoing     Problem: Infection - Risk of, Puerperal Infection:  Goal: Will show no infection signs and symptoms  Description: Will show no infection signs and symptoms  4/21/2021 1819 by Rahul Tony RN  Outcome: Ongoing  4/21/2021 1110 by Elicia David RN  Outcome: Ongoing     Problem: Mood - Altered:  Goal: Mood stable  Description: Mood stable  4/21/2021 1819 by Rahul Tony RN  Outcome: Ongoing  4/21/2021 1110 by Elicia David RN  Outcome: Ongoing     Problem: Pain - Acute:  Goal: Pain level will decrease  Description: Pain level will decrease  4/21/2021 1819 by Rahul Tony RN  Outcome: Ongoing  4/21/2021 1110 by Elicia David RN  Outcome: Ongoing

## 2021-04-21 NOTE — PROGRESS NOTES
Dr. Sherie Mireles in department and updated on patient SVE of 5/90/-1 at 2301. Dr. Sherie Mireles states to perform SVE next time patients bladder needs to be emptied and to place redding catheter if needed. Will monitor.

## 2021-04-21 NOTE — LACTATION NOTE
This note was copied from a baby's chart. Lactation Progress Note      Data:     Called to room to assist mother with getting sleepy infant to feed. Mother stated that infant has spit up a couple times and had a wet and dirty diaper since last feeding. Action:  LC assisted mother with getting infant to breast. Infant is not interested in eating. LC was able to get infant to suck a couple times at the breast but she would not continue. Mother can easily express many drops of colostrum in infant's mouth. Mother encouraged to allow infant to sleep for another 3 hours and then try again. Mother encouraged to call for f/u assistance as needed. Response: Mother is happy that she has a lot of colostrum but would like for the infant to latch and eat.

## 2021-04-21 NOTE — LACTATION NOTE
This note was copied from a baby's chart. Introduced self to patient as Lactation RN, name and phone number written on white board in room. Mother was trying to get infant to wake up so [address] undressed infant and placed her in football hold at the right breast. LC attempted to get infant to latch by expressing drops of colostrum but infant was disinterested. Infant is gagging and does not want to suck. LC expressed many drops of colostrum in infant's mouth and encouraged mother to let her rest for 3 hours and try again. Mother instructed to call Lactation nurse for F/U care as needed.

## 2021-04-21 NOTE — PROGRESS NOTES
Spoke with Dr. Chhaya Infante via telephone about patient SVE of 10/100/+1. Dr. Chhaya Infante states to let patient labor down for 30 minutes and then begin pushing. Will monitor.

## 2021-04-21 NOTE — PROGRESS NOTES
This RN reviewed all documentation and supervised all care done by Torrey Mcmillan RN.   Sudha Daniel

## 2021-04-21 NOTE — LACTATION NOTE
This note was copied from a baby's chart. Lactation Progress Note      Data:   Primip breast feeder delivered this am. Mob [facility] [address] [facility] with [facility]. She reports that the first feed was very good but now baby won't latch. Baby currently bundled. Action: Reassured of normal feeding behavior of the first few days. Baby placed skin to skin at breast and mob encouraged to manually express colostrum to encourage feed. Baby rooting and a good latch was achieved easily with SRS and AS. Breast feeding education initiated. Encouraged to allow baby to go to breast ad gennaro and stressed importance of always achieving a good deep latch. Offered LC assistance prn. Discouraged paci and bottles for the first few weeks. Encouraged good hydration and nutrition. [address] [facility] number on board for f/u. Response: Pleased with feed. Verbalized and demonstrated understanding.

## 2021-04-21 NOTE — L&D DELIVERY NOTE
[facility] Obstetrics and Gynecology  Spontaneous Vaginal Delivery Note        Pre-operative Diagnosis:    1. IUP at 39+1     Post-operative Diagnosis:   1. Postpartum s/p              Anesthesia:  epidural      Admission and Delivery:       Patient presented to [address] [place] [address] and Delivery for scheduled induction of labor on 21. Patient was admitted for induction. She was 3/50/-2 on admission. She received an epidural for pain control. Patient was augmented with pitocin and AROM and progressed spontaneously to complete cervical dilation. Began pushing and with good maternal effort. Pushed for approximately 2.5 hrs with good maternal effort. The infant was delivered in the FELTON position. Nuchal cord was absent. The shoulders and body were quickly to follow with maternal efforts. Infant was delivered with good tone and spontaneous cry without complication. Mouth and nose were bulb suctioned at maternal abdomen. Cord segment and cord blood were obtained. APGARS were noted to be 8 and 9. Delivery Date and Time: 21 at 0736. The placenta was delivered spontaneously with manual massage of the uterus and was noted to be intact with a 3 vessel cord. Pitocin was started immediately after placenta delivery. Bleeding noted to be appropriate. 1st degree and left labial lacerations were noted and repaired with 3-0 Vicryl. The patient tolerated well and is in stable condition following delivery. EBL: 300 ml     Infant Wt: Pending     APGARS: 8, 9      Specimen:  cord blood    Infant Feeding: breast     Condition:  infant stable and mother stable      Attending Attestation: I performed the procedure.     Lavanda Koyanagi, MD

## 2021-04-21 NOTE — PROGRESS NOTES
Attempt to get up to BRx2 assist. Patient still having numbness in lower extremities. Patient requesting to urinate on bedpan. Patient able to void 300 ml. Meri care performed.

## 2021-04-21 NOTE — TELEPHONE ENCOUNTER
Re-faxed disability forms to [facility] at [phone] d/t they received all pages except the signature page.

## 2021-04-22 LAB
ABO/RH: NORMAL
FETAL SCREEN: NORMAL
HCT VFR BLD CALC: 34.4 % (ref 36–48)
HEMOGLOBIN: 11.3 G/DL (ref 12–16)
MCH RBC QN AUTO: 29.3 PG (ref 26–34)
MCHC RBC AUTO-ENTMCNC: 32.9 G/DL (ref 31–36)
MCV RBC AUTO: 89.2 FL (ref 80–100)
PDW BLD-RTO: 15.7 % (ref 12.4–15.4)
PLATELET # BLD: 123 K/UL (ref 135–450)
PMV BLD AUTO: 9.2 FL (ref 5–10.5)
RBC # BLD: 3.86 M/UL (ref 4–5.2)
RHIG LOT NUMBER: NORMAL
WBC # BLD: 15.9 K/UL (ref 4–11)

## 2021-04-22 PROCEDURE — 85461 HEMOGLOBIN FETAL: CPT

## 2021-04-22 PROCEDURE — 96372 THER/PROPH/DIAG INJ SC/IM: CPT

## 2021-04-22 PROCEDURE — 86901 BLOOD TYPING SEROLOGIC RH(D): CPT

## 2021-04-22 PROCEDURE — 6370000000 HC RX 637 (ALT 250 FOR IP): Performed by: OBSTETRICS & GYNECOLOGY

## 2021-04-22 PROCEDURE — 1220000000 HC SEMI PRIVATE OB R&B

## 2021-04-22 PROCEDURE — 6360000002 HC RX W HCPCS: Performed by: OBSTETRICS & GYNECOLOGY

## 2021-04-22 PROCEDURE — 85027 COMPLETE CBC AUTOMATED: CPT

## 2021-04-22 PROCEDURE — 86900 BLOOD TYPING SEROLOGIC ABO: CPT

## 2021-04-22 PROCEDURE — 36415 COLL VENOUS BLD VENIPUNCTURE: CPT

## 2021-04-22 RX ADMIN — HYDROCODONE BITARTRATE AND ACETAMINOPHEN 1 TABLET: 5; 325 TABLET ORAL at 20:00

## 2021-04-22 RX ADMIN — ACETAMINOPHEN 650 MG: 325 TABLET ORAL at 18:15

## 2021-04-22 RX ADMIN — IBUPROFEN 800 MG: 800 TABLET, FILM COATED ORAL at 14:20

## 2021-04-22 RX ADMIN — HUMAN RHO(D) IMMUNE GLOBULIN 300 MCG: 300 INJECTION, SOLUTION INTRAMUSCULAR at 14:29

## 2021-04-22 RX ADMIN — ACETAMINOPHEN 650 MG: 325 TABLET ORAL at 09:10

## 2021-04-22 RX ADMIN — IBUPROFEN 800 MG: 800 TABLET, FILM COATED ORAL at 05:17

## 2021-04-22 RX ADMIN — DOCUSATE SODIUM 100 MG: 100 CAPSULE, LIQUID FILLED ORAL at 09:06

## 2021-04-22 ASSESSMENT — PAIN SCALES - GENERAL
PAINLEVEL_OUTOF10: 5
PAINLEVEL_OUTOF10: 6
PAINLEVEL_OUTOF10: 4

## 2021-04-22 NOTE — FLOWSHEET NOTE
Discharge Phone Call Log  Patient Name: Natalia Charles     [facility] Care Provider: Baron Manjinder MD Discharge Date: 2021    Disposition of baby:    Phone Number: [phone] (home)     Attempts to Contact:  Date:    Nurse  Date:    Nurse  Date:    Nurse    1. Now that you are at home is your pain being well controlled? Y/N   What pain reducing measures are you using? ____________________________________        Information for the patient's :  Cathie Landon [[id number]]   Delivery Method: Vaginal, Spontaneous     2. Are you currently  having any infant feeding issues? Y/N _____________________________ If yes, please explain: __________________________________________________________________  3. If breastfeeding, were you satisfied with the breastfeeding support services offered? Y/N  4.  Have you had to supplement? Y/N If yes, please explain: _____________________________________________________       Did you supplement while in the hospital, or begin formula supplementation at home?________________________________________  5. Did your OB provider offer you information about the benefits of breastfeeding during your prenatal visits? Y/N  6.  Have you made or have you already had your first appointment with the baby's doctor? Y/N If no, do you know when to schedule it? Y/N   7.  Have you scheduled your follow-up appointment? Y/N  If no, do you know when to schedule it? Y/N  8. Did staff discuss safe sleep during your stay? Y/N  Did you see the wall cling posted in your room explaining how to keep you and your baby safe? Y/N  10. Did your nurses and physicians include you in the plan of care, communicating with you respectfully? Y/N If no, please explain __________________________  11. Is there anyone in particular you would like to mention who provided care for you? ________________________________  12. Did your discharge occur in a timely manner?   Y/N If no, please explain __________________________  13. Do you have any other questions or concerns I can address today?  Y/N  __________________________________________________      Teaching During interview :_____________________________________________  ___________________________RN       Date:______________Time:________________

## 2021-04-22 NOTE — PLAN OF CARE
Problem: Discharge Planning:  Goal: Discharged to appropriate level of care  Description: Discharged to appropriate level of care  4/22/2021 1939 by Zoe Jaquez RN  Outcome: Ongoing  4/22/2021 1135 by Hortensia Mendez RN  Outcome: Ongoing     Problem: Constipation:  Goal: Bowel elimination is within specified parameters  Description: Bowel elimination is within specified parameters  4/22/2021 1939 by Zoe Jaquez RN  Outcome: Ongoing  4/22/2021 1135 by Hortensia Mendez RN  Outcome: Ongoing     Problem: Fluid Volume - Imbalance:  Goal: Absence of imbalanced fluid volume signs and symptoms  Description: Absence of imbalanced fluid volume signs and symptoms  4/22/2021 1939 by Zoe Jaquez RN  Outcome: Ongoing  4/22/2021 1135 by Hortensia Mendez RN  Outcome: Ongoing  Goal: Absence of postpartum hemorrhage signs and symptoms  Description: Absence of postpartum hemorrhage signs and symptoms  4/22/2021 1939 by Zoe Jaquez RN  Outcome: Ongoing  4/22/2021 1135 by Hortensia Mendez RN  Outcome: Ongoing     Problem: Infection - Risk of, Puerperal Infection:  Goal: Will show no infection signs and symptoms  Description: Will show no infection signs and symptoms  4/22/2021 1939 by Zoe Jaquez RN  Outcome: Ongoing  4/22/2021 1135 by Hortensia Mendez RN  Outcome: Ongoing     Problem: Mood - Altered:  Goal: Mood stable  Description: Mood stable  4/22/2021 1939 by Zoe Jaquez RN  Outcome: Ongoing  4/22/2021 1135 by Hortensia Mendez RN  Outcome: Ongoing     Problem: Pain - Acute:  Goal: Pain level will decrease  Description: Pain level will decrease  4/22/2021 1939 by Zoe Jaquez RN  Outcome: Ongoing  4/22/2021 1135 by Hortensia Mendez RN  Outcome: Ongoing

## 2021-04-22 NOTE — ANESTHESIA POSTPROCEDURE EVALUATION
Department of Anesthesiology  Postprocedure Note    Patient: Ara Kendrick  MRN: [id number]  YOB: 2000  Date of evaluation: 4/22/2021  Time:  2:42 PM     Procedure Summary     Date: 04/20/21 Room / Location:     Anesthesia Start: 1746 Anesthesia Stop: 04/21/21 0736    Procedure: Labor Analgesia Diagnosis:     Scheduled Providers:  Responsible Provider: Magdalena Gaytan MD    Anesthesia Type: epidural ASA Status: 2          Anesthesia Type: epidural    Scott Phase I: Scott Score: 9    Scott Phase II: Scott Score: 10    Last vitals: Reviewed and per EMR flowsheets.        Anesthesia Post Evaluation    Patient location during evaluation: bedside  Patient participation: complete - patient participated  Level of consciousness: awake and alert  Airway patency: patent  Nausea & Vomiting: no vomiting and nausea (MIld nausea after eating)  Complications: no  Cardiovascular status: hemodynamically stable  Respiratory status: acceptable  Hydration status: stable

## 2021-04-22 NOTE — PROGRESS NOTES
[facility] Ob/Gyn  Post Partum Progress Note    Subjective:   Patient is a 24 y.o. y/o  female S/P  at 39+1 EGA. PPD # 1. The pregnancy was complicated by: fetal renal anomaly, Rh(-) status, teen pregnancy. Doing well today. No current complaints are noted including headache, change in vision, fever, chills, chest pain, shortness of breath, nausea, vomiting, diarrhea or constipation. The patient denies dizziness with ambulation or calf tenderness. Voiding spontaneously. Lochia is described as mild. The patient plans to breastfeed - going well. Objective:   GENERAL APPEARANCE: alert, well appearing, in no apparent distress  LUNGS: Resp effort normal   HEART: regular rate   ABDOMEN POSTPARTUM: benign non-tender, without masses or organomegaly palpable . Uterine Fundus firm, NT, Below umbilicus. EXTREMITIES: no redness or tenderness in the calves or thighs, no edema  SKIN: normal coloration and turgor, no rashes, no suspicious skin lesions noted    Pertinent Labs:   H/H: 12.4/37.0 > 11.3/34.4    Assessment / Plan:  1) 24 y.o.  female S/P  at 39+1 EGA.  PPD # 1.    - meeting post partum milestones    - hemodynamically stable     2)  A-/ab-, RI, GBS neg   - Rho Nellie PP     3) Baby Information    - Delivery Time / Date: 21 @0736   - APGARS: 8, 9    - Birth weight: 6 lb 9.1 oz (2.98 kg)   - Feeding: breast   - Female     4)  Fetal renal anomaly   - dilated kidneys on US, saw MFM, suspect normal variant   - elective  renal US recommended       Disposition:   Post Partum Instructions reviewed   Anticipate discharge on PPD # 2 pending clinical status     Whitney Wong, DO

## 2021-04-22 NOTE — LACTATION NOTE
Lactation Progress Note      Data:   F/U on 1/0 breast feeder whose baby is now over 25 hours old. Mob states that baby has been feeding well. Output is good. Action: Breast feeding education reviewed. Encouraged to call [facility] for assistance with latch or other questions prn. KRUNAL number on board. Response: Verbalized understanding and will call for f/u as needed.

## 2021-04-22 NOTE — LACTATION NOTE
This note was copied from a baby's chart. Lactation Progress Note      Data:    Called to room to assist mother with sleepy infant. Mother stated infant did well with last feeding. Infant has been gagging and spitting up some. Action: LC assisted mother with getting infant to the left breast and expressed a drop of colostrum. Even with stimulation, infant would not wake up and start sucking. Infant did lick a couple drops but many drops were expressed in her mouth. Mother encouraged to allow infant to sleep for 3 hours and try again. Mother is easily able to express drops to keep infant's blood sugar up. Mother encouraged to call for f/u assistance. Response: Mother was ok with giving drops this time and then swaddling infant for some sleep.

## 2021-04-23 VITALS
TEMPERATURE: 98 F | RESPIRATION RATE: 16 BRPM | BODY MASS INDEX: 25.99 KG/M2 | HEIGHT: 65 IN | HEART RATE: 86 BPM | DIASTOLIC BLOOD PRESSURE: 70 MMHG | WEIGHT: 156 LBS | SYSTOLIC BLOOD PRESSURE: 100 MMHG

## 2021-04-23 PROCEDURE — 6370000000 HC RX 637 (ALT 250 FOR IP): Performed by: OBSTETRICS & GYNECOLOGY

## 2021-04-23 RX ORDER — PSEUDOEPHEDRINE HCL 30 MG
100 TABLET ORAL 2 TIMES DAILY PRN
Qty: 30 CAPSULE | Refills: 1 | Status: SHIPPED | OUTPATIENT
Start: 2021-04-23 | End: 2021-04-28

## 2021-04-23 RX ORDER — IBUPROFEN 800 MG/1
800 TABLET ORAL EVERY 8 HOURS PRN
Qty: 40 TABLET | Refills: 1 | Status: SHIPPED | OUTPATIENT
Start: 2021-04-23 | End: 2022-03-10

## 2021-04-23 RX ADMIN — HYDROCODONE BITARTRATE AND ACETAMINOPHEN 1 TABLET: 5; 325 TABLET ORAL at 07:40

## 2021-04-23 RX ADMIN — HYDROCODONE BITARTRATE AND ACETAMINOPHEN 1 TABLET: 5; 325 TABLET ORAL at 01:10

## 2021-04-23 RX ADMIN — IBUPROFEN 800 MG: 800 TABLET, FILM COATED ORAL at 05:41

## 2021-04-23 ASSESSMENT — PAIN SCALES - GENERAL
PAINLEVEL_OUTOF10: 8
PAINLEVEL_OUTOF10: 7

## 2021-04-23 NOTE — PROGRESS NOTES
[facility] Ob/Gyn  Post Partum Progress Note    Subjective:   Patient is a 24 y.o. y/o  female S/P  at 39+1 EGA. PPD # 2. The pregnancy was complicated by: fetal renal anomaly, Rh(-) status, teen pregnancy. Doing well today. No current complaints are noted including headache, change in vision, fever, chills, chest pain, shortness of breath, nausea, vomiting, diarrhea or constipation. The patient denies dizziness with ambulation or calf tenderness. Voiding spontaneously. Lochia is described as mild. The patient plans to breastfeed - going well. Objective:   GENERAL APPEARANCE: alert, well appearing, in no apparent distress  LUNGS: Resp effort normal, CTAB  HEART: regular rate, RRR  ABDOMEN POSTPARTUM: benign non-tender, without masses or organomegaly palpable . Uterine Fundus firm, NT, Below umbilicus. EXTREMITIES: no redness or tenderness in the calves or thighs, no edema  SKIN: normal coloration and turgor, no rashes, no suspicious skin lesions noted    Pertinent Labs:   H/H: 12.4/37.0 > 11.3/34.4    Assessment / Plan:  1) 24 y.o.  female S/P  at 39+1 EGA.  PPD # 2.    - meeting post partum milestones    - hemodynamically stable     2)  A-/ab-, RI, GBS neg   - Rho Nellie PP     3) Baby Information    - Delivery Time / Date: 21 @0736   - APGARS: 8, 9    - Birth weight: 6 lb 9.1 oz (2.98 kg)   - Feeding: breast   - Female     4)  Fetal renal anomaly   - dilated kidneys on US, saw MFM, suspect normal variant   - elective  renal US recommended       Disposition:   Post Partum Instructions reviewed   Anticipate discharge home today    Elena Jackson MD

## 2021-04-23 NOTE — FLOWSHEET NOTE
Discharge Phone Call Log  Patient Name: Terrance Pro     [facility] Care Provider: Gal Wynne MD Discharge Date: 2021    Disposition of baby:    Phone Number: [phone] (home)     Attempts to Contact:  Date:    Nurse  Date:    Nurse  Date:    Nurse    1. Now that you are at home is your pain being well controlled? Y/N   What pain reducing measures are you using? ____________________________________        Information for the patient's :  Fred Lennon [[id number]]   Delivery Method: Vaginal, Spontaneous     2. Are you currently  having any infant feeding issues? Y/N _____________________________ If yes, please explain: __________________________________________________________________  3. If breastfeeding, were you satisfied with the breastfeeding support services offered? Y/N  4.  Have you had to supplement? Y/N If yes, please explain: _____________________________________________________       Did you supplement while in the hospital, or begin formula supplementation at home?________________________________________  5. Did your OB provider offer you information about the benefits of breastfeeding during your prenatal visits? Y/N  6.  Have you made or have you already had your first appointment with the baby's doctor? Y/N If no, do you know when to schedule it? Y/N   7.  Have you scheduled your follow-up appointment? Y/N  If no, do you know when to schedule it? Y/N  8. Did staff discuss safe sleep during your stay? Y/N  Did you see the wall cling posted in your room explaining how to keep you and your baby safe? Y/N  10. Did your nurses and physicians include you in the plan of care, communicating with you respectfully? Y/N If no, please explain __________________________  11. Is there anyone in particular you would like to mention who provided care for you? ________________________________  12. Did your discharge occur in a timely manner?   Y/N If no, please explain __________________________  13. Do you have any other questions or concerns I can address today?  Y/N  __________________________________________________      Teaching During interview :_____________________________________________  ___________________________RN       Date:______________Time:________________

## 2021-04-23 NOTE — PLAN OF CARE
Problem: Discharge Planning:  Goal: Discharged to appropriate level of care  Description: Discharged to appropriate level of care  4/23/2021 0055 by Fabiana Molina RN  Outcome: Ongoing  4/22/2021 1939 by Jc Myers RN  Outcome: Ongoing  4/22/2021 1135 by Justin Rodríguez RN  Outcome: Ongoing     Problem: Constipation:  Goal: Bowel elimination is within specified parameters  Description: Bowel elimination is within specified parameters  4/23/2021 0055 by aFbiana Molina RN  Outcome: Ongoing  4/22/2021 1939 by Jc Myers RN  Outcome: Ongoing  4/22/2021 1135 by Justin Rodríguez RN  Outcome: Ongoing     Problem: Fluid Volume - Imbalance:  Goal: Absence of imbalanced fluid volume signs and symptoms  Description: Absence of imbalanced fluid volume signs and symptoms  4/23/2021 0055 by Fabiana Molina RN  Outcome: Ongoing  4/22/2021 1939 by Jc Myers RN  Outcome: Ongoing  4/22/2021 1135 by Justin Rodríguez RN  Outcome: Ongoing  Goal: Absence of postpartum hemorrhage signs and symptoms  Description: Absence of postpartum hemorrhage signs and symptoms  4/23/2021 0055 by Fabiana Molina RN  Outcome: Ongoing  4/22/2021 1939 by Jc Myers RN  Outcome: Ongoing  4/22/2021 1135 by Justin Rodríguez RN  Outcome: Ongoing     Problem: Infection - Risk of, Puerperal Infection:  Goal: Will show no infection signs and symptoms  Description: Will show no infection signs and symptoms  4/23/2021 0055 by Fabiana Molina RN  Outcome: Ongoing  4/22/2021 1939 by Jc Myers RN  Outcome: Ongoing  4/22/2021 1135 by Justin Rodríguez RN  Outcome: Ongoing     Problem: Mood - Altered:  Goal: Mood stable  Description: Mood stable  4/23/2021 0055 by Fabiana Molina RN  Outcome: Ongoing  4/22/2021 1939 by Jc Myers RN  Outcome: Ongoing  4/22/2021 1135 by Justin Rodríguez RN  Outcome: Ongoing     Problem: Pain - Acute:  Goal: Pain level will decrease  Description: Pain level will

## 2021-04-23 NOTE — LACTATION NOTE
This note was copied from a baby's chart. Lactation Progress Note      Data:     F/u during lactation rounds with primip breast feeder, 1 pp. Pt reports baby has been breast feeding well, struggled some with latching with this feeding, but was able to get baby latched independently and now feeding well. C/o sore nipples, but states feeling better with this feeding. Action: Introduced self as  UTILICASE on for this evening and offered much support. Reviewed importance of GONZALEZ, and explained how a good latch should look and feel. Instructed how to break the latch if ever shallow, pinching or painful. Discussed care of sore nipples, and lanolin use which is at bedside. Instructed pt that her nipple should be rounded when baby releases from the breast without creasing or pinching. Explained that if sore, initial latch on may be tender, but should become comfortable and ease within a few seconds after latching and comfortable throughout the remainder of the feeding. Breast feeding education reviewed including breast care, when to expect mature milk supply, expected  feeding behaviors during the first 24-48 hours of life, and how to know infant is getting enough at the breast including appropriate feedings, output, and weight trends. Encouraged much STS, offering the breast exclusively, when infant is first begining to wake and show hunger cues, and every 2-3 hours if baby is sleepy and without cues. Gave tips to wake sleepy baby as needed. Encouraged much STS and hand expression of colostrum when offering the breast. Instructed pt that baby should have a minimum of 8-12 good feedings in a 24 hour period after the first DOL. Reviewed risks related to pacifiers, artificial nipples, and formula supplements when given without medical indication. Encouraged exclusive breast feeding unless medical indication were to arise. Name and number provided on whiteboard.  Encouraged to call for  UTILICASE to assess latch and for f/u support prn.      Response: Verbalized understanding of teaching provided. Will call for f/u support prn.

## 2021-04-23 NOTE — DISCHARGE SUMMARY
Inpatient Discharge Summary    Admission Diagnosis:    1. IUP at 39+0   2. Rh negative   3. Dilated fetal kidneys  Discharge Diagnosis:    1. Postpartum s/p    2. Rh negative  Procedures:   Consults: lactation, anesthesia  Significant Findings: Patient delivered a viable female infant, apgars 8/9, and weight 2980g.  1st degree laceration noted  Complications: none    Discharge Instructions:  Diet:common adult  Activity:activity as tolerated  Medications: see MAR    Disposition: Home  Condition on discharge: Stable  Follow up: in 2 week(s)    Onofre Hayes MD  [facility]

## 2021-04-24 ENCOUNTER — TELEPHONE (OUTPATIENT)
Dept: OBGYN CLINIC | Age: 21
End: 2021-04-24

## 2021-04-24 DIAGNOSIS — R30.0 DYSURIA: Primary | ICD-10-CM

## 2021-04-24 RX ORDER — NITROFURANTOIN 25; 75 MG/1; MG/1
100 CAPSULE ORAL 2 TIMES DAILY
Qty: 10 CAPSULE | Refills: 0 | Status: SHIPPED | OUTPATIENT
Start: 2021-04-24 | End: 2021-04-28

## 2021-04-24 RX ORDER — PHENAZOPYRIDINE HYDROCHLORIDE 200 MG/1
200 TABLET, FILM COATED ORAL 3 TIMES DAILY PRN
Qty: 6 TABLET | Refills: 0 | Status: SHIPPED | OUTPATIENT
Start: 2021-04-24 | End: 2021-04-27

## 2021-04-24 NOTE — TELEPHONE ENCOUNTER
C/o dysuria / pelvic pain especially with urination. Sent in [address] / pyridium. If symptoms do not improve, come in for evaluation in office early next week.     Edi

## 2021-04-26 ENCOUNTER — TELEPHONE (OUTPATIENT)
Dept: OBGYN CLINIC | Age: 21
End: 2021-04-26

## 2021-04-26 NOTE — TELEPHONE ENCOUNTER
Any fevers/chills or other symptoms? This can be normal after a delivery as things heal and it takes time for the medications to clear a UTI. We can schedule an appointment to see her in the office if things dont' get better. Thanks.

## 2021-04-26 NOTE — TELEPHONE ENCOUNTER
Patient called in call on Saturday with c/o UTI symptoms. Stated the burning almost seems like it has worsened. Stated she was started on pyridium and macrobid and its worsened. Please advise.      Routing Dr. Rafa Reynolds

## 2021-04-27 ENCOUNTER — TELEPHONE (OUTPATIENT)
Dept: OBGYN CLINIC | Age: 21
End: 2021-04-27

## 2021-04-28 ENCOUNTER — POSTPARTUM VISIT (OUTPATIENT)
Dept: OBGYN CLINIC | Age: 21
End: 2021-04-28
Payer: COMMERCIAL

## 2021-04-28 VITALS
TEMPERATURE: 97.1 F | HEART RATE: 101 BPM | HEIGHT: 65 IN | SYSTOLIC BLOOD PRESSURE: 108 MMHG | WEIGHT: 139 LBS | DIASTOLIC BLOOD PRESSURE: 74 MMHG | BODY MASS INDEX: 23.16 KG/M2

## 2021-04-28 DIAGNOSIS — Z87.59 HISTORY OF MOLAR PREGNANCY: ICD-10-CM

## 2021-04-28 DIAGNOSIS — R30.0 DYSURIA: ICD-10-CM

## 2021-04-28 PROBLEM — O35.EXX0 ANOMALY OF KIDNEY OF FETUS IN SINGLETON PREGNANCY: Status: RESOLVED | Noted: 2021-02-17 | Resolved: 2021-04-28

## 2021-04-28 PROBLEM — Z34.90 ENCOUNTER FOR INDUCTION OF LABOR: Status: RESOLVED | Noted: 2021-04-20 | Resolved: 2021-04-28

## 2021-04-28 PROBLEM — O26.899 RH NEGATIVE STATE IN ANTEPARTUM PERIOD: Status: RESOLVED | Noted: 2020-09-20 | Resolved: 2021-04-28

## 2021-04-28 PROBLEM — Z34.93 PRENATAL CARE IN THIRD TRIMESTER: Status: RESOLVED | Noted: 2020-09-20 | Resolved: 2021-04-28

## 2021-04-28 PROBLEM — Z67.91 RH NEGATIVE STATE IN ANTEPARTUM PERIOD: Status: RESOLVED | Noted: 2020-09-20 | Resolved: 2021-04-28

## 2021-04-28 PROBLEM — O21.9 NAUSEA AND VOMITING OF PREGNANCY, ANTEPARTUM: Status: RESOLVED | Noted: 2020-10-15 | Resolved: 2021-04-21

## 2021-04-28 PROBLEM — O21.9 NAUSEA AND VOMITING OF PREGNANCY, ANTEPARTUM: Status: RESOLVED | Noted: 2020-10-15 | Resolved: 2021-04-28

## 2021-04-28 PROCEDURE — 81002 URINALYSIS NONAUTO W/O SCOPE: CPT | Performed by: OBSTETRICS & GYNECOLOGY

## 2021-04-28 PROCEDURE — 0503F POSTPARTUM CARE VISIT: CPT | Performed by: OBSTETRICS & GYNECOLOGY

## 2021-04-28 ASSESSMENT — PATIENT HEALTH QUESTIONNAIRE - PHQ9
SUM OF ALL RESPONSES TO PHQ QUESTIONS 1-9: 0
SUM OF ALL RESPONSES TO PHQ9 QUESTIONS 1 & 2: 0
SUM OF ALL RESPONSES TO PHQ QUESTIONS 1-9: 0
SUM OF ALL RESPONSES TO PHQ QUESTIONS 1-9: 0

## 2021-04-28 NOTE — PROGRESS NOTES
Postpartum Visit    Subjective:  24 y.o.  female S/P uncomplicated Vaginal Delivery on 21 here for postpartum visit. The pregnancy was complicated by Rh negative status and dilated fetal kidney. .     Burning with urination, not like a UTI though but just burns like she has a cut that is getting irritated. No odor to urine, no frequency. Tried pyridium and macrobid and had a reaction with rash on butt and leg. Had pain yesterday on her right side that is gone today. Bleeding is light period. Bowel function is normal. Bladder function is abnormal: see above. Patient is not sexually active. Contraception method is abstinence. Baby has been doing well without problems. Baby is feeding breast. No other complaints are noted including headache, change in vision, fever, chills, chest pain, shortness of breath, nausea, vomiting, diarrhea or constipation. Review of Systems - The following ROS was otherwise negative, except as noted in the HPI: constitutional, respiratory, cardiovascular, gastrointestinal, genitourinary    Objective:  GENERAL APPEARANCE: alert, well appearing, in no apparent distress  LUNGS: clear to auscultation, no wheezes, rales or rhonchi, symmetric air entry  HEART: regular rate and rhythm, no murmurs  ABDOMEN POSTPARTUM: benign non-tender, without masses or organomegaly palpable  EXTERNAL GENITALIA POSTPARTUM: left labial/periurethral laceration with suture in place, well approximated without evidence of infection, stitch removed today without difficulty      MWQ: 0    Impression:  24 y.o.  s/p vaginal delivery on 21 here for her postpartum visit:    Plan:  1.  (normal spontaneous vaginal delivery)  Doing well, routine care  - Feeding: breast, going well  - BCM: discuss at next visit  - Moods: no signs/sx PPD, denies SI/HI  - Bleeding: improving    2. History of molar pregnancy  Patient with molar pregnancy and D&C in 2020.  Never had adequate post-op HCG screening, will consider doing this postpartum, consider HCG at next visit    3. Dysuria  Suspect more related to healing laceration rather than UTI but will send UCx today and treat PRN.      Dispo: PRN or 4-5 weeks for Anshu Haq MD

## 2021-04-30 LAB — URINE CULTURE, ROUTINE: NORMAL

## 2021-05-06 ENCOUNTER — TELEPHONE (OUTPATIENT)
Dept: OBGYN CLINIC | Age: 21
End: 2021-05-06

## 2021-05-06 NOTE — TELEPHONE ENCOUNTER
Pt calling with concern for mastitis. She is having pain in the right breast. She has noticed a red streak. The breast is engorged. She has continued to feed every few hours. ?  Fevers, No flu like symptoms. She has not noticed a fever. Pended Dynapen.  Please advise

## 2021-05-07 NOTE — TELEPHONE ENCOUNTER
Okay for script. If no improvement or worsening symptoms please provide instructions for follow-up in ED. Please have patient schedule with Dr. Chuy Faulkner next week to recheck symptoms. Thank you.

## 2021-05-07 NOTE — TELEPHONE ENCOUNTER
[phone] ([place])     Placed call to patient, verbalized understanding. No questions or concerns voiced.  appt scheduled for 5/14

## 2021-05-14 ENCOUNTER — POSTPARTUM VISIT (OUTPATIENT)
Dept: OBGYN CLINIC | Age: 21
End: 2021-05-14

## 2021-05-14 VITALS
DIASTOLIC BLOOD PRESSURE: 78 MMHG | SYSTOLIC BLOOD PRESSURE: 108 MMHG | WEIGHT: 135.4 LBS | BODY MASS INDEX: 22.53 KG/M2 | HEART RATE: 91 BPM | TEMPERATURE: 97.6 F

## 2021-05-14 DIAGNOSIS — Z87.59 HISTORY OF MOLAR PREGNANCY: ICD-10-CM

## 2021-05-14 PROCEDURE — 0503F POSTPARTUM CARE VISIT: CPT | Performed by: OBSTETRICS & GYNECOLOGY

## 2021-05-14 NOTE — PROGRESS NOTES
Postpartum Visit    Subjective:  24 y.o.  female S/P uncomplicated Vaginal Delivery on 21 here for postpartum visit. The pregnancy was complicated by Rh negative status and dilated fetal kidney. Had mastitis last week, said breast was tender and then she got a red streak on it as well. Called in, given rx and took antibiotics which have helped a lot. Not pumping anymore, only feeds on demand. Bleeding still intermittently heavy, spotting today but bigger clots a couple days ago. No odor, no discharge today. Postpartum course has been complicated by mastitis and dysuria. Bleeding as above. Bowel function is normal. Bladder function is normal. Patient is not sexually active. Contraception method is abstinence. Baby has been doing well without problems. Baby is feeding breast. No other complaints are noted including headache, change in vision, fever, chills, chest pain, shortness of breath, nausea, vomiting, diarrhea or constipation. The patient denies any urinary complaints or calf tenderness. Review of Systems - The following ROS was otherwise negative, except as noted in the HPI: constitutional, respiratory, cardiovascular, gastrointestinal, genitourinary    Objective:  GENERAL APPEARANCE: alert, well appearing, in no apparent distress  BREAST: normal breast exam, no palpable clogged ducts, no redness or tenderness  LUNGS: clear to auscultation, no wheezes, rales or rhonchi, symmetric air entry  HEART: regular rate and rhythm  ABDOMEN POSTPARTUM: benign non-tender, without masses or organomegaly palpable  EXTREMITIES: no redness or tenderness in the calves or thighs    Impression:  24 y.o.  s/p vaginal delivery on 21 here for her postpartum visit:    Plan:  1.  (normal spontaneous vaginal delivery)  Doing well, routine care  - Feeding: breast, going well.  Reviewed ways to avoid oversupply and return precautions  - BCM: will discuss with PCP at next visit, if not started on

## 2021-05-25 ENCOUNTER — TELEPHONE (OUTPATIENT)
Dept: OBGYN CLINIC | Age: 21
End: 2021-05-25

## 2021-05-26 NOTE — TELEPHONE ENCOUNTER
Pt calling because she woke up and had to change her clothes from bleeding. She placed a pad and soaked through in an hour. She says she is having pain, pressure and cramping like after she had the baby. Pt was advised to report to ED. Please advise.

## 2021-06-04 ENCOUNTER — POSTPARTUM VISIT (OUTPATIENT)
Dept: OBGYN CLINIC | Age: 21
End: 2021-06-04
Payer: COMMERCIAL

## 2021-06-04 VITALS
HEART RATE: 69 BPM | DIASTOLIC BLOOD PRESSURE: 82 MMHG | SYSTOLIC BLOOD PRESSURE: 112 MMHG | BODY MASS INDEX: 22.57 KG/M2 | TEMPERATURE: 98.3 F | WEIGHT: 135.6 LBS

## 2021-06-04 DIAGNOSIS — Z87.59 HISTORY OF MOLAR PREGNANCY: ICD-10-CM

## 2021-06-04 PROCEDURE — 0503F POSTPARTUM CARE VISIT: CPT | Performed by: OBSTETRICS & GYNECOLOGY

## 2021-06-04 PROCEDURE — 36415 COLL VENOUS BLD VENIPUNCTURE: CPT | Performed by: OBSTETRICS & GYNECOLOGY

## 2021-06-04 NOTE — PROGRESS NOTES
Postpartum Visit    Subjective:  24 y.o.  female S/P uncomplicated Vaginal Delivery on 21 here for postpartum visit. The pregnancy was complicated by Rh negative status and dilated fetal kidney. Postpartum course has been complicated by  mastitis, resolved now after antibiotics. Pain is well controlled overall. Last week did have some heavy bleeding and cramping. Was heavy for 3-4 days, then lightened up. Did have some big clots, that resolved. No bleeding or cramping for the last week. Otherwise doing well, bowel function is normal. Bladder function is normal. Patient has been sexually active. Baby has been doing well without problems. Baby is feeding breast. No other complaints are noted including headache, change in vision, fever, chills, chest pain, shortness of breath, nausea, vomiting, diarrhea or constipation. The patient denies any urinary complaints or calf tenderness. Review of Systems - The following ROS was otherwise negative, except as noted in the HPI: constitutional, respiratory, cardiovascular, gastrointestinal, genitourinary    Objective:  GENERAL APPEARANCE: alert, well appearing, in no apparent distress  LUNGS: clear to auscultation, no wheezes, rales or rhonchi, symmetric air entry  HEART: regular rate and rhythm, no murmurs  ABDOMEN POSTPARTUM: benign non-tender, without masses or organomegaly palpable  EXTREMITIES: no redness or tenderness in the calves or thighs, no edema    MWQ: 3    Impression:  24 y.o.  s/p vaginal delivery on 21 here for her postpartum visit:    Plan:  1.  (normal spontaneous vaginal delivery)  Doing well, routine care  - Feeding: breast, going well  - BCM: has an rx for POP from her PCP  - Moods: no signs/sx PPD, denies SI/HI  - Bleeding: resolved    2. History of molar pregnancy  History of molar pregnancy, D&C 2020.  Never had adequate post-op HCG screening, UPT negative today, will get serum HCG as well to ensure back to negative.   - HCG, QUANTITATIVE, PREGNANCY      Dispo: PRN or 6 months for annual exam  Tj Pat MD

## 2021-06-05 LAB — GONADOTROPIN, CHORIONIC (HCG) QUANT: <5 MIU/ML

## 2021-11-12 ENCOUNTER — TELEPHONE (OUTPATIENT)
Dept: OBGYN CLINIC | Age: 21
End: 2021-11-12

## 2021-11-12 NOTE — TELEPHONE ENCOUNTER
Patient called and stated she is still breast feeding after delivery and has not had a period. Stated she woke up with pains below her belly button in the middle. Stated it feels like she is very bloated, no appetite, with cramps, but during the night it woke her up in tears, stated this am it is back to being just cramps. Stated she is having increase in discharge(clear watery discharge), denies fever. Stated she took a pregnancy test last night and thought maybe she saw a faint line. Was given pain/bleeding precautions. Please advise.      Routing to Dr. Pedro Seaman

## 2021-11-12 NOTE — TELEPHONE ENCOUNTER
[phone] ([place])     Placed call to patient, verbalized understanding. Stated she took another urine test this am and it was negative.      Please advise    Routing to Dr. Frank Schaumann

## 2021-11-12 NOTE — TELEPHONE ENCOUNTER
We need to confirm whether or nto she is pregnant with either an office pregnancy test or HCG level. Thanks.

## 2022-01-31 ENCOUNTER — TELEPHONE (OUTPATIENT)
Dept: OBGYN CLINIC | Age: 22
End: 2022-01-31

## 2022-01-31 NOTE — TELEPHONE ENCOUNTER
Recommendations as above. If symptoms persist will need to come in to be seen with possible ultrasound. Thanks.

## 2022-01-31 NOTE — TELEPHONE ENCOUNTER
Pt states she has been breastfeeding and this is the first period she has had since having the baby. She is concerned that the bleeding is heavy. She reports soaking through a pad about every 3 hours. She says the last time an orange size clot was present. She is having cramping type pain. Pt was given bleeding precautions. Pt is aware if bleeding heavy to the point of soaking a pad or tampon every hour or having large clots that she will need to report to the Emergency Department. Please advise

## 2022-02-10 ENCOUNTER — OFFICE VISIT (OUTPATIENT)
Dept: OBGYN CLINIC | Age: 22
End: 2022-02-10
Payer: COMMERCIAL

## 2022-02-10 VITALS
WEIGHT: 122.4 LBS | SYSTOLIC BLOOD PRESSURE: 114 MMHG | TEMPERATURE: 98.1 F | DIASTOLIC BLOOD PRESSURE: 76 MMHG | HEART RATE: 91 BPM | BODY MASS INDEX: 20.37 KG/M2

## 2022-02-10 DIAGNOSIS — N92.1 MENORRHAGIA WITH IRREGULAR CYCLE: Primary | ICD-10-CM

## 2022-02-10 PROCEDURE — 36415 COLL VENOUS BLD VENIPUNCTURE: CPT | Performed by: OBSTETRICS & GYNECOLOGY

## 2022-02-10 PROCEDURE — 99213 OFFICE O/P EST LOW 20 MIN: CPT | Performed by: OBSTETRICS & GYNECOLOGY

## 2022-02-10 NOTE — PROGRESS NOTES
Return Gyn Office Visit    CC:   Chief Complaint   Patient presents with    Follow-up       HPI:  22 y.o. [id number] who presents to office for follow-up. Last week had a very heavy period with clots. Couldn't use a tampon because it was so heavy. Bad cramping pain. Is 9 months postpartum, still breastfeeding exclusively. Has only had 3 periods since delivery. Last week lasted 7-8 days, 4 days really heavy. Felt lightheaded because of the bleeding. At one point was changing pads every hour. Not constantly that heavy but had random gushes. Lots of cramping. Last period prior to this was 3 months ago and only 2 months ago. Stopped using BCM a few months ago. Was only on POP for a few months. 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 2/17/2021    reports seeing specialist and specialist not concerned     Past Surgical History:   Procedure Laterality Date    DILATION AND CURETTAGE      DILATION AND CURETTAGE OF UTERUS N/A 5/26/2020    SUCTION DILATATION AND CURETTAGE performed by Reji Cordova MD at [facility]     Current Outpatient Medications on File Prior to Visit   Medication Sig Dispense Refill    ibuprofen (ADVIL;MOTRIN) 800 MG tablet Take 1 tablet by mouth every 8 hours as needed for Pain (Patient not taking: Reported on 5/14/2021) 40 tablet 1    Prenatal Vit-Fe Fumarate-FA (PRENATAL 1+1 PO) Take 1 tablet by mouth daily (Patient not taking: Reported on 2/10/2022)       No current facility-administered medications on file prior to visit. Allergies   Allergen Reactions    Macrobid [Nitrofurantoin] Rash     Itching and rash         Objective:  /76 (Site: Right Upper Arm, Position: Sitting, Cuff Size: Medium Adult)   Pulse 91   Temp 98.1 °F (36.7 °C) (Infrared)   Wt 122 lb 6.4 oz (55.5 kg)   BMI 20.37 kg/m²   Physical Exam  HENT:      Head: Normocephalic. Cardiovascular:      Rate and Rhythm: Normal rate.    Pulmonary:      Effort: Pulmonary effort is normal. No respiratory distress. Abdominal:      Tenderness: There is no abdominal tenderness. There is no guarding or rebound. Genitourinary:     Comments: Pelvic exam: VULVA: normal appearing vulva with no masses, tenderness or lesions, VAGINA: normal appearing vagina with normal color and discharge, no lesions, CERVIX: normal appearing cervix without discharge or lesions, UTERUS: uterus is normal size, shape, consistency and nontender, ADNEXA: normal adnexa in size, nontender and no masses. Skin:     General: Skin is warm and dry. Neurological:      General: No focal deficit present. Mental Status: She is alert. Psychiatric:         Mood and Affect: Mood normal.       Assessment/Plan  1. Menorrhagia with irregular cycle  Patient with heavy period as above. Suspect may be related to it being first menses after delivery, however will send below labs given history of molar pregnancy as well. Will montior periods over next 1-2 cycles, if persistently heavy consider follow-up US as well.    - HCG, QUANTITATIVE, PREGNANCY  - TSH with Reflex    Dispo: PRN or July for annual exam/pap smear  Vicente Blunt MD

## 2022-02-11 LAB
GONADOTROPIN, CHORIONIC (HCG) QUANT: <5 MIU/ML
TSH REFLEX: 0.62 UIU/ML (ref 0.27–4.2)

## 2022-03-10 ENCOUNTER — OFFICE VISIT (OUTPATIENT)
Dept: ENT CLINIC | Age: 22
End: 2022-03-10
Payer: COMMERCIAL

## 2022-03-10 VITALS
HEIGHT: 65 IN | TEMPERATURE: 97.9 F | BODY MASS INDEX: 19.99 KG/M2 | SYSTOLIC BLOOD PRESSURE: 136 MMHG | DIASTOLIC BLOOD PRESSURE: 88 MMHG | WEIGHT: 120 LBS

## 2022-03-10 DIAGNOSIS — M26.609 TMJ (TEMPOROMANDIBULAR JOINT DISORDER): ICD-10-CM

## 2022-03-10 DIAGNOSIS — M54.2 NECK PAIN: Primary | ICD-10-CM

## 2022-03-10 PROCEDURE — 99203 OFFICE O/P NEW LOW 30 MIN: CPT | Performed by: OTOLARYNGOLOGY

## 2022-03-10 ASSESSMENT — ENCOUNTER SYMPTOMS
COUGH: 0
SORE THROAT: 0
VOICE CHANGE: 0
SINUS PRESSURE: 0
FACIAL SWELLING: 0
EYE ITCHING: 0
APNEA: 0
SHORTNESS OF BREATH: 0
TROUBLE SWALLOWING: 0

## 2022-03-10 NOTE — PROGRESS NOTES
[address], [address], [address] [place] [address]  P: [phone]       Patient     Lexie Betancourt  2000    ChiefComplaint     Chief Complaint   Patient presents with    New Patient     Patient states that she gets pain in the right side of her neck that gets worse at night and after eating       History of Present Illness     Amy Anderson is a 81-year-old female here today for evaluation of right-sided neck pain. Has been ongoing for several months to years. Has history of recurrent submandibular sialadenitis diagnosed at age 6 and was considered for surgery. Since that time has been diligent with drinking water and doing massage. Does not tolerate sour candies. Posterior neck pain is new and different. Has history of TMJ not using her bite guard.     Past Medical History     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 2/17/2021    reports seeing specialist and specialist not concerned       Past Surgical History     Past Surgical History:   Procedure Laterality Date    DILATION AND CURETTAGE      DILATION AND CURETTAGE OF UTERUS N/A 5/26/2020    SUCTION DILATATION AND CURETTAGE performed by Shyann Gaviria MD at [facility] OR       Family History     Family History   Problem Relation Age of Onset    Diabetes Paternal Grandfather     Stroke Paternal Grandfather     Diabetes Maternal Grandmother     Ovarian Cancer Father     Ovarian Cancer Mother     No Known Problems Brother     No Known Problems Sister     No Known Problems Sister     No Known Problems Sister        Social History     Social History     Tobacco Use    Smoking status: Never Smoker    Smokeless tobacco: Never Used   Vaping Use    Vaping Use: Never used   Substance Use Topics    Alcohol use: No    Drug use: No        Allergies     Allergies   Allergen Reactions    Macrobid [Nitrofurantoin] Rash     Itching and rash       Medications     No current outpatient medications on file. No current facility-administered medications for this visit. Review of Systems     Review of Systems   Constitutional: Negative for appetite change, chills, fatigue, fever and unexpected weight change. HENT: Negative for congestion, ear discharge, ear pain, facial swelling, hearing loss, nosebleeds, postnasal drip, sinus pressure, sneezing, sore throat, tinnitus, trouble swallowing and voice change. Eyes: Negative for itching. Respiratory: Negative for apnea, cough and shortness of breath. Endocrine: Negative for cold intolerance and heat intolerance. Musculoskeletal: Positive for neck pain. Negative for myalgias. Skin: Negative for rash. Allergic/Immunologic: Negative for environmental allergies. Neurological: Negative for dizziness and headaches. Psychiatric/Behavioral: Negative for confusion, decreased concentration and sleep disturbance. PhysicalExam     Vitals:    03/10/22 1521   BP: 136/88   Site: Right Upper Arm   Position: Sitting   Temp: 97.9 °F (36.6 °C)   Weight: 120 lb (54.4 kg)   Height: 5' 5\" (1.651 m)       Physical Exam  Constitutional:       General: She is not in acute distress. Appearance: She is well-developed. HENT:      Head: Normocephalic and atraumatic. Comments: Subluxation of joint with movement     Right Ear: Tympanic membrane, ear canal and external ear normal. No drainage. No middle ear effusion. Tympanic membrane is not bulging. Tympanic membrane has normal mobility. Left Ear: Tympanic membrane, ear canal and external ear normal. No drainage. No middle ear effusion. Tympanic membrane is not bulging. Tympanic membrane has normal mobility. Nose: No mucosal edema or rhinorrhea. Mouth/Throat:      Lips: Pink. Mouth: Mucous membranes are moist.      Tongue: No lesions. Palate: No mass. Pharynx: Uvula midline. Eyes:      Pupils: Pupils are equal, round, and reactive to light.    Neck: Thyroid: No thyroid mass or thyromegaly. Trachea: Trachea and phonation normal.     Cardiovascular:      Pulses: Normal pulses. Pulmonary:      Effort: Pulmonary effort is normal. No accessory muscle usage or respiratory distress. Breath sounds: No stridor. Musculoskeletal:      Cervical back: Full passive range of motion without pain. Lymphadenopathy:      Head:      Right side of head: No submental or submandibular adenopathy. Left side of head: No submental or submandibular adenopathy. Cervical: No cervical adenopathy. Right cervical: No superficial, deep or posterior cervical adenopathy. Left cervical: No superficial, deep or posterior cervical adenopathy. Skin:     General: Skin is warm and dry. Neurological:      Mental Status: She is alert and oriented to person, place, and time. Cranial Nerves: No cranial nerve deficit. Coordination: Coordination normal.      Gait: Gait normal.   Psychiatric:         Thought Content: Thought content normal.           Assessment and Plan     1. Neck pain  -Localizes to insertion of trapezius on occiput  -Recommend proper body alignment particularly when holding her daughter and massage    2. TMJ (temporomandibular joint disorder)  -Start using bite guard again      Return as needed    Aram Tobar,   3/10/22      Portions of this note were dictated using Dragon.  There may be linguistic errors secondary to the use of this program.

## 2022-07-11 ENCOUNTER — TELEPHONE (OUTPATIENT)
Dept: OBGYN CLINIC | Age: 22
End: 2022-07-11

## 2022-07-11 NOTE — TELEPHONE ENCOUNTER
Patient is scheduled for amenorrhea on 8/8/2022. She has a history of molar pregnancy and wants to know if she should come in for the rhogam shot earlier than 8/8? Routing to Dr. Pedro Floyd.

## 2022-07-12 NOTE — TELEPHONE ENCOUNTER
If she is feeling OK 6-7 weeks would be reasonable. If any bleeding/pain we can see her sooner. Thanks.

## 2022-07-12 NOTE — TELEPHONE ENCOUNTER
Pt denies any bleeding at this time. She will call office for sooner appointment if any bleeding or cramping.

## 2022-08-12 ENCOUNTER — OFFICE VISIT (OUTPATIENT)
Dept: OBGYN CLINIC | Age: 22
End: 2022-08-12
Payer: COMMERCIAL

## 2022-08-12 VITALS
SYSTOLIC BLOOD PRESSURE: 117 MMHG | DIASTOLIC BLOOD PRESSURE: 82 MMHG | TEMPERATURE: 98.4 F | BODY MASS INDEX: 19.6 KG/M2 | WEIGHT: 117.8 LBS | HEART RATE: 78 BPM

## 2022-08-12 DIAGNOSIS — Z20.2 POSSIBLE EXPOSURE TO STD: ICD-10-CM

## 2022-08-12 DIAGNOSIS — Z12.4 PAP SMEAR FOR CERVICAL CANCER SCREENING: ICD-10-CM

## 2022-08-12 DIAGNOSIS — N91.2 AMENORRHEA: Primary | ICD-10-CM

## 2022-08-12 DIAGNOSIS — Z01.419 ENCOUNTER FOR ANNUAL ROUTINE GYNECOLOGICAL EXAMINATION: Primary | ICD-10-CM

## 2022-08-12 DIAGNOSIS — N91.2 AMENORRHEA: ICD-10-CM

## 2022-08-12 LAB
BACTERIA: ABNORMAL /HPF
BILIRUBIN URINE: NEGATIVE
BLOOD, URINE: NEGATIVE
CLARITY: CLEAR
COLOR: YELLOW
CRL: NORMAL
EPITHELIAL CELLS, UA: 10 /HPF (ref 0–5)
GLUCOSE URINE: NEGATIVE MG/DL
HYALINE CASTS: 1 /LPF (ref 0–8)
KETONES, URINE: ABNORMAL MG/DL
LEUKOCYTE ESTERASE, URINE: ABNORMAL
MICROSCOPIC EXAMINATION: YES
NITRITE, URINE: NEGATIVE
PH UA: 6.5 (ref 5–8)
PROTEIN UA: ABNORMAL MG/DL
RBC UA: 1 /HPF (ref 0–4)
SAC DIAMETER: NORMAL
SPECIFIC GRAVITY UA: 1.03 (ref 1–1.03)
URINE TYPE: ABNORMAL
UROBILINOGEN, URINE: 0.2 E.U./DL
WBC UA: 12 /HPF (ref 0–5)

## 2022-08-12 PROCEDURE — 81025 URINE PREGNANCY TEST: CPT | Performed by: OBSTETRICS & GYNECOLOGY

## 2022-08-12 PROCEDURE — 99395 PREV VISIT EST AGE 18-39: CPT | Performed by: OBSTETRICS & GYNECOLOGY

## 2022-08-12 PROCEDURE — 76801 OB US < 14 WKS SINGLE FETUS: CPT | Performed by: OBSTETRICS & GYNECOLOGY

## 2022-08-12 NOTE — PROGRESS NOTES
Annual Exam      CC:   Chief Complaint   Patient presents with    Amenorrhea       HPI:  25 y.o. Neil Ventura presents for her gynecologic annual exam.     Also here for amenorrhea. LMP 22. Patient unsure but hasn't had a normal one since delivered her child 15 months ago. No nausea/vomtiing, just very hungry. No bleeding, some cramping. Patient seen and examined. Review of Systems:   Review of Systems   Constitutional:  Negative for chills and fever. Respiratory:  Negative for shortness of breath. Gastrointestinal:  Positive for abdominal pain. Negative for nausea and vomiting. Genitourinary:  Negative for difficulty urinating, dysuria, menstrual problem, vaginal bleeding and vaginal discharge. Neurological:  Negative for headaches. Primary Care Physician: No primary care provider on file.     Obstetric History  OB History    Para Term  AB Living   2 1 1   1 1   SAB IAB Ectopic Molar Multiple Live Births         1 0 1      # Outcome Date GA Lbr Lio/2nd Weight Sex Delivery Anes PTL Lv   2 Term 21 39w1d 09:44 / 02:56 6 lb 9.1 oz (2.98 kg) F Vag-Spont EPI N ANGELIKA      Birth Comments: franco   1 Molar 20               Gynecologic History  Menstrual History:  LMP: 22  Menstrual pattern: irregular prior to pregnancy  Sexual History:  Contraception: none  Currently is sexually active  Denies history of STIs  No sexual problems  Pap History:  Last pap smear: n/a  History of abnormal pap smears: n/a    Medical History:  Past Medical History:   Diagnosis Date    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 2021    reports seeing specialist and specialist not concerned       Surgical History:  Past Surgical History:   Procedure Laterality Date    DILATION AND CURETTAGE      DILATION AND CURETTAGE OF UTERUS N/A 2020    SUCTION DILATATION AND CURETTAGE performed by Nanci Hernandez MD at [facility] OR       Medications:  Current Outpatient Medications   Medication Sig Dispense Refill    Prenatal MV & Min w/FA-DHA (PRENATAL ADULT GUMMY/DHA/FA PO) Take by mouth       No current facility-administered medications for this visit. Allergies: Allergies   Allergen Reactions    Macrobid [Nitrofurantoin] Rash     Itching and rash       Social History:  Social History     Socioeconomic History    Marital status: Single   Tobacco Use    Smoking status: Never    Smokeless tobacco: Never   Vaping Use    Vaping Use: Never used   Substance and Sexual Activity    Alcohol use: No    Drug use: No    Sexual activity: Yes     Partners: Male       Family History:  Family History   Problem Relation Age of Onset    Diabetes Paternal Grandfather     Stroke Paternal Grandfather     Diabetes Maternal Grandmother     Ovarian Cancer Father     Ovarian Cancer Mother     No Known Problems Brother     No Known Problems Sister     No Known Problems Sister     No Known Problems Sister        Objective: Body mass index is 19.6 kg/m². /82 (Site: Left Upper Arm, Position: Sitting, Cuff Size: Medium Adult)   Pulse 78   Temp 98.4 °F (36.9 °C) (Infrared)   Wt 117 lb 12.8 oz (53.4 kg)   LMP 06/02/2022 (Approximate)   BMI 19.60 kg/m²     Exam:   Physical Exam  Exam conducted with a chaperone present. Constitutional:       Appearance: She is well-developed. HENT:      Head: Normocephalic and atraumatic. Cardiovascular:      Rate and Rhythm: Normal rate and regular rhythm. Pulmonary:      Effort: Pulmonary effort is normal. No respiratory distress. Chest:   Breasts:     Right: Normal. No mass, nipple discharge or skin change. Left: Normal. No mass, nipple discharge or skin change. Abdominal:      General: There is no distension. Palpations: Abdomen is soft. Tenderness: There is no abdominal tenderness. There is no guarding or rebound.    Genitourinary:     Comments: Pelvic exam: VULVA: normal appearing vulva with no masses, tenderness or lesions, VAGINA: normal appearing vagina STD  - C.trachomatis N.gonorrhoeae DNA  - Vaginal Pathogens Probes *A  - Urinalysis with Microscopic  - Culture, Urine    4. Amenorrhea  Patient with amenorrhea, US today shows single IUP with cardiac activity at 8+5 with final ROCAEL 3/19/23 by Carla Martino.  - Discussed routine prenatal care, early pregnancy precautions, continued use of PNV  - Below labs sent  - Briefly reviewed options for genetic screening including cffDNA, 1st trimester screen with NT/CAMILA-A, and MQS in 2nd trimester. Readdress next visit.   - POCT urine pregnancy      Dispo: PRN or 4 weeks for IPV  Jacobo Rasheed MD

## 2022-08-13 LAB
CANDIDA SPECIES, DNA PROBE: NORMAL
GARDNERELLA VAGINALIS, DNA PROBE: NORMAL
TRICHOMONAS VAGINALIS DNA: NORMAL

## 2022-08-13 ASSESSMENT — ENCOUNTER SYMPTOMS
NAUSEA: 0
SHORTNESS OF BREATH: 0
VOMITING: 0
ABDOMINAL PAIN: 1

## 2022-08-14 LAB — URINE CULTURE, ROUTINE: NORMAL

## 2022-08-16 LAB
C TRACH DNA GENITAL QL NAA+PROBE: NEGATIVE
N. GONORRHOEAE DNA: NEGATIVE

## 2022-09-09 ENCOUNTER — INITIAL PRENATAL (OUTPATIENT)
Dept: OBGYN CLINIC | Age: 22
End: 2022-09-09
Payer: COMMERCIAL

## 2022-09-09 VITALS
HEART RATE: 127 BPM | BODY MASS INDEX: 20.14 KG/M2 | WEIGHT: 121 LBS | DIASTOLIC BLOOD PRESSURE: 72 MMHG | SYSTOLIC BLOOD PRESSURE: 118 MMHG

## 2022-09-09 DIAGNOSIS — Z67.91 RH NEGATIVE STATE IN ANTEPARTUM PERIOD: ICD-10-CM

## 2022-09-09 DIAGNOSIS — O26.899 RH NEGATIVE STATE IN ANTEPARTUM PERIOD: ICD-10-CM

## 2022-09-09 DIAGNOSIS — Z34.91 PRENATAL CARE IN FIRST TRIMESTER: Primary | ICD-10-CM

## 2022-09-09 PROCEDURE — 36415 COLL VENOUS BLD VENIPUNCTURE: CPT | Performed by: OBSTETRICS & GYNECOLOGY

## 2022-09-09 PROCEDURE — 0500F INITIAL PRENATAL CARE VISIT: CPT | Performed by: OBSTETRICS & GYNECOLOGY

## 2022-09-09 NOTE — ASSESSMENT & PLAN NOTE
- FWB: reassuring by lamont today  - Genetic screening: declined  - Anatomy scan: plan at 20 weeks  - Flu shot: discuss next visit  - Tdap: 28 weeks  - PNL: sent today, GCCT/trich neg, UCx no growth

## 2022-09-10 LAB
ABO/RH: NORMAL
AMPHETAMINE SCREEN, URINE: NORMAL
ANTIBODY SCREEN: NORMAL
BARBITURATE SCREEN URINE: NORMAL
BASOPHILS ABSOLUTE: 0 K/UL (ref 0–0.2)
BASOPHILS RELATIVE PERCENT: 0.5 %
BENZODIAZEPINE SCREEN, URINE: NORMAL
BUPRENORPHINE URINE: NORMAL
CANNABINOID SCREEN URINE: NORMAL
COCAINE METABOLITE SCREEN URINE: NORMAL
EOSINOPHILS ABSOLUTE: 0 K/UL (ref 0–0.6)
EOSINOPHILS RELATIVE PERCENT: 0.6 %
FENTANYL SCREEN, URINE: NORMAL
HCT VFR BLD CALC: 38.2 % (ref 36–48)
HEMOGLOBIN: 12.9 G/DL (ref 12–16)
HEPATITIS B SURFACE ANTIGEN INTERPRETATION: NORMAL
HIV AG/AB: NORMAL
HIV ANTIGEN: NORMAL
HIV-1 ANTIBODY: NORMAL
HIV-2 AB: NORMAL
LYMPHOCYTES ABSOLUTE: 0.9 K/UL (ref 1–5.1)
LYMPHOCYTES RELATIVE PERCENT: 14.6 %
Lab: NORMAL
MCH RBC QN AUTO: 29.7 PG (ref 26–34)
MCHC RBC AUTO-ENTMCNC: 33.8 G/DL (ref 31–36)
MCV RBC AUTO: 87.9 FL (ref 80–100)
METHADONE SCREEN, URINE: NORMAL
MONOCYTES ABSOLUTE: 0.3 K/UL (ref 0–1.3)
MONOCYTES RELATIVE PERCENT: 4.2 %
NEUTROPHILS ABSOLUTE: 5.2 K/UL (ref 1.7–7.7)
NEUTROPHILS RELATIVE PERCENT: 80.1 %
OPIATE SCREEN URINE: NORMAL
OXYCODONE URINE: NORMAL
PDW BLD-RTO: 14.2 % (ref 12.4–15.4)
PH UA: 7
PHENCYCLIDINE SCREEN URINE: NORMAL
PLATELET # BLD: 158 K/UL (ref 135–450)
PMV BLD AUTO: 9.9 FL (ref 5–10.5)
RBC # BLD: 4.35 M/UL (ref 4–5.2)
RUBELLA ANTIBODY IGG: 115.8 IU/ML
TOTAL SYPHILLIS IGG/IGM: NORMAL
VARICELLA-ZOSTER VIRUS AB, IGG: NORMAL
WBC # BLD: 6.4 K/UL (ref 4–11)

## 2022-09-12 LAB
HEPATITIS C VIRUS AB BY CIA INDEX: 0.02 IV
HEPATITIS C VIRUS AB BY CIA INTERPRETATION: NEGATIVE

## 2022-10-07 ENCOUNTER — ROUTINE PRENATAL (OUTPATIENT)
Dept: OBGYN CLINIC | Age: 22
End: 2022-10-07

## 2022-10-07 VITALS
DIASTOLIC BLOOD PRESSURE: 70 MMHG | BODY MASS INDEX: 20.93 KG/M2 | WEIGHT: 125.8 LBS | HEART RATE: 111 BPM | SYSTOLIC BLOOD PRESSURE: 118 MMHG

## 2022-10-07 DIAGNOSIS — Z67.91 RH NEGATIVE STATE IN ANTEPARTUM PERIOD: ICD-10-CM

## 2022-10-07 DIAGNOSIS — Z34.92 PRENATAL CARE IN SECOND TRIMESTER: Primary | ICD-10-CM

## 2022-10-07 DIAGNOSIS — O26.899 RH NEGATIVE STATE IN ANTEPARTUM PERIOD: ICD-10-CM

## 2022-10-07 PROCEDURE — 0502F SUBSEQUENT PRENATAL CARE: CPT | Performed by: OBSTETRICS & GYNECOLOGY

## 2022-10-07 NOTE — PROGRESS NOTES
Maternal emotional well being screening form completed and reviewed with patient. Current score is 0.    Patient given referral to [address] [place] [address] ([phone]): No    Temp - 98

## 2022-10-07 NOTE — PROGRESS NOTES
Return OB Office Visit    CC:   Chief Complaint   Patient presents with    Routine Prenatal Visit       HPI:  Pt seen and examined. No concerns/complaints. Denies VB, LOF, cramps. Some +FM already. Maternal wellness questionnaire reviewed - no concerns today. Score 0.      Objective:  /70   Pulse (!) 111   Wt 125 lb 12.8 oz (57.1 kg)   LMP 2022 (Approximate)   BMI 20.93 kg/m²   Gen: AO, NAD  Abd: Soft, NT  FHT: 156    Assessment/Plan:  25 y.o.  at 16w5d (Estimated Date of Delivery: 3/19/23) presents for BRUCE appointment:     Problem List Items Addressed This Visit          Other    Prenatal care in second trimester - Primary     - FWB: reassuring by lamont today  - Genetic screening: declined  - Anatomy scan: plan at 20 weeks  - Flu shot: discuss next visit  - Tdap: 28 weeks  - PNL: A-/ab-, RI, HepB/C neg, HIVnr, RPRnr, UDS neg, Hgb 12.9, VZV immune, GCCT/trich neg, UCx no growth          Rh negative state in antepartum period     Plan rhgoam at 28 weeks and PP work-up             Dispo: RTC in 4 weeks  Astrid Acevedo MD

## 2022-10-07 NOTE — ASSESSMENT & PLAN NOTE
- FWB: reassuring by lamont today  - Genetic screening: declined  - Anatomy scan: plan at 20 weeks  - Flu shot: discuss next visit  - Tdap: 28 weeks  - PNL: A-/ab-, RI, HepB/C neg, HIVnr, RPRnr, UDS neg, Hgb 12.9, VZV immune, GCCT/trich neg, UCx no growth 23

## 2022-11-04 ENCOUNTER — OFFICE VISIT (OUTPATIENT)
Dept: OBGYN CLINIC | Age: 22
End: 2022-11-04
Payer: COMMERCIAL

## 2022-11-04 ENCOUNTER — ROUTINE PRENATAL (OUTPATIENT)
Dept: OBGYN CLINIC | Age: 22
End: 2022-11-04

## 2022-11-04 VITALS
HEART RATE: 96 BPM | BODY MASS INDEX: 21.3 KG/M2 | DIASTOLIC BLOOD PRESSURE: 76 MMHG | WEIGHT: 128 LBS | SYSTOLIC BLOOD PRESSURE: 114 MMHG

## 2022-11-04 DIAGNOSIS — Z34.92 PRENATAL CARE IN SECOND TRIMESTER: Primary | ICD-10-CM

## 2022-11-04 DIAGNOSIS — O26.899 RH NEGATIVE STATE IN ANTEPARTUM PERIOD: ICD-10-CM

## 2022-11-04 DIAGNOSIS — Z67.91 RH NEGATIVE STATE IN ANTEPARTUM PERIOD: ICD-10-CM

## 2022-11-04 PROCEDURE — 76805 OB US >/= 14 WKS SNGL FETUS: CPT | Performed by: OBSTETRICS & GYNECOLOGY

## 2022-11-04 PROCEDURE — 0502F SUBSEQUENT PRENATAL CARE: CPT | Performed by: OBSTETRICS & GYNECOLOGY

## 2022-11-04 NOTE — PROGRESS NOTES
Return OB Office Visit    CC:   Chief Complaint   Patient presents with    Routine Prenatal Visit       HPI:  Pt seen and examined. No concerns/complaints. Denies VB, LOF, ctx. +FM. Maternal wellness questionnaire reviewed - no concerns today. Score 3.      Objective:  /76   Pulse 96   Wt 128 lb (58.1 kg)   LMP 2022 (Approximate)   BMI 21.30 kg/m²   Gen: AO, NAD  Abd: Soft, NT  FHT: 145    Assessment/Plan:  25 y.o.  at 20w5d (Estimated Date of Delivery: 3/19/23) presents for BRUCE appointment:     Problem List Items Addressed This Visit          Other    Prenatal care in second trimester - Primary     - FWB: reassuring by lamont today  - Genetic screening: declined  - Anatomy scan: 22: 366g (39%ile), nl anatomy, anterior placenta/no previa, CL 3.32cm, it's a girl!  - Flu shot: discuss next visit  - Tdap: 28 weeks  - PNL: A-/ab-, RI, HepB/C neg, HIVnr, RPRnr, UDS neg, Hgb 12.9, VZV immune, GCCT/trich neg, UCx no growth          Rh negative state in antepartum period     Plan rhgoam at 28 weeks and PP work-up             Dispo: RTC in 4 weeks  Maritza Jiang MD

## 2022-11-06 NOTE — ASSESSMENT & PLAN NOTE
- FWB: reassuring by lamont today  - Genetic screening: declined  - Anatomy scan: 11/4/22: 366g (39%ile), nl anatomy, anterior placenta/no previa, CL 3.32cm, it's a girl!  - Flu shot: discuss next visit  - Tdap: 28 weeks  - PNL: A-/ab-, RI, HepB/C neg, HIVnr, RPRnr, UDS neg, Hgb 12.9, VZV immune, GCCT/trich neg, UCx no growth

## 2022-12-01 ENCOUNTER — ROUTINE PRENATAL (OUTPATIENT)
Dept: OBGYN CLINIC | Age: 22
End: 2022-12-01

## 2022-12-01 ENCOUNTER — OFFICE VISIT (OUTPATIENT)
Dept: OBGYN CLINIC | Age: 22
End: 2022-12-01
Payer: COMMERCIAL

## 2022-12-01 VITALS
DIASTOLIC BLOOD PRESSURE: 84 MMHG | BODY MASS INDEX: 22.93 KG/M2 | SYSTOLIC BLOOD PRESSURE: 126 MMHG | HEART RATE: 128 BPM | WEIGHT: 137.8 LBS

## 2022-12-01 DIAGNOSIS — Z67.91 RH NEGATIVE STATE IN ANTEPARTUM PERIOD: ICD-10-CM

## 2022-12-01 DIAGNOSIS — Z34.92 PRENATAL CARE IN SECOND TRIMESTER: Primary | ICD-10-CM

## 2022-12-01 DIAGNOSIS — O26.899 RH NEGATIVE STATE IN ANTEPARTUM PERIOD: ICD-10-CM

## 2022-12-01 PROCEDURE — 76816 OB US FOLLOW-UP PER FETUS: CPT | Performed by: OBSTETRICS & GYNECOLOGY

## 2022-12-01 PROCEDURE — 0502F SUBSEQUENT PRENATAL CARE: CPT | Performed by: OBSTETRICS & GYNECOLOGY

## 2022-12-01 NOTE — PROGRESS NOTES
Return OB Office Visit    CC:   Chief Complaint   Patient presents with    Routine Prenatal Visit       HPI:  Pt seen and examined. No concerns/complaints. Denies VB, LOF, ctx. +FM    Maternal wellness questionnaire reviewed - no concerns today. Score 0.      Objective:  /84   Pulse (!) 128   Wt 137 lb 12.8 oz (62.5 kg)   LMP 2022 (Approximate)   BMI 22.93 kg/m²   Gen: AO, NAD  Abd: Soft, NT  FHT: 157    Assessment/Plan:  25 y.o.  at 24w4d (Estimated Date of Delivery: 3/19/23) presents for BRUCE appointment:     Problem List Items Addressed This Visit          Other    Prenatal care in second trimester - Primary     - FWB: reassuring by doptonangel today  - Genetic screening: declined  - Anatomy scan: 22: 366g (39%ile), nl anatomy, anterior placenta/no previa, CL 3.32cm, it's a girl!    - 22: 680g (28%ile), nl anatomy  - Flu shot: discuss next visit  - Tdap: 28 weeks  - PNL: A-/ab-, RI, HepB/C neg, HIVnr, RPRnr, UDS neg, Hgb 12.9, VZV immune, GCCT/trich neg, UCx no growth    - GTT/CBC next visit          Rh negative state in antepartum period     Plan rhgoam at 28 weeks and PP work-up             Dispo: RTC in 4 weeks, GTT/CBC, Jaylene Garica MD

## 2022-12-01 NOTE — ASSESSMENT & PLAN NOTE
- FWB: reassuring by lamont today  - Genetic screening: declined  - Anatomy scan: 11/4/22: 366g (39%ile), nl anatomy, anterior placenta/no previa, CL 3.32cm, it's a girl!    - 12/1/22: 680g (28%ile), nl anatomy  - Flu shot: discuss next visit  - Tdap: 28 weeks  - PNL: A-/ab-, RI, HepB/C neg, HIVnr, RPRnr, UDS neg, Hgb 12.9, VZV immune, GCCT/trich neg, UCx no growth    - GTT/CBC next visit

## 2022-12-28 ENCOUNTER — ROUTINE PRENATAL (OUTPATIENT)
Dept: OBGYN CLINIC | Age: 22
End: 2022-12-28
Payer: COMMERCIAL

## 2022-12-28 VITALS
HEART RATE: 137 BPM | BODY MASS INDEX: 24.13 KG/M2 | WEIGHT: 145 LBS | SYSTOLIC BLOOD PRESSURE: 114 MMHG | DIASTOLIC BLOOD PRESSURE: 82 MMHG

## 2022-12-28 DIAGNOSIS — Z34.93 PRENATAL CARE IN THIRD TRIMESTER: Primary | ICD-10-CM

## 2022-12-28 DIAGNOSIS — Z67.91 RH NEGATIVE STATE IN ANTEPARTUM PERIOD: ICD-10-CM

## 2022-12-28 DIAGNOSIS — O26.899 RH NEGATIVE STATE IN ANTEPARTUM PERIOD: ICD-10-CM

## 2022-12-28 PROCEDURE — 90471 IMMUNIZATION ADMIN: CPT | Performed by: OBSTETRICS & GYNECOLOGY

## 2022-12-28 PROCEDURE — 90715 TDAP VACCINE 7 YRS/> IM: CPT | Performed by: OBSTETRICS & GYNECOLOGY

## 2022-12-28 PROCEDURE — 96372 THER/PROPH/DIAG INJ SC/IM: CPT | Performed by: OBSTETRICS & GYNECOLOGY

## 2022-12-28 PROCEDURE — 0502F SUBSEQUENT PRENATAL CARE: CPT | Performed by: OBSTETRICS & GYNECOLOGY

## 2022-12-28 PROCEDURE — 36415 COLL VENOUS BLD VENIPUNCTURE: CPT | Performed by: OBSTETRICS & GYNECOLOGY

## 2022-12-28 RX ORDER — LANOLIN ALCOHOL/MO/W.PET/CERES
325 CREAM (GRAM) TOPICAL
COMMUNITY

## 2022-12-28 NOTE — PROGRESS NOTES
Return OB Office Visit    CC:   Chief Complaint   Patient presents with    Routine Prenatal Visit       HPI:  Pt seen and examined. No concerns/complaints. Denies VB, LOF, ctx. +FM. Maternal wellness questionnaire reviewed - no concerns today. Score 0.      Objective:  /82   Pulse (!) 137   Wt 145 lb (65.8 kg)   LMP 2022 (Approximate)   BMI 24.13 kg/m²   Gen: AO, NAD  Abd: Soft, NT  FHT: 154  FH: 27cm, unk by Leopolds  Ext: Mild LE edema    Assessment/Plan:  25 y.o.  at 28w3d (Estimated Date of Delivery: 3/19/23) presents for BRUCE appointment:     Problem List Items Addressed This Visit          Other    Prenatal care in third trimester - Primary     - FWB: reassuring by lamont today  - Genetic screening: declined  - Anatomy scan: 22: 366g (39%ile), nl anatomy, anterior placenta/no previa, CL 3.32cm, it's a girl!    - 22: 680g (28%ile), nl anatomy  - Flu shot: discuss next visit  - Tdap: 22  - PNL: A-/ab-, RI, HepB/C neg, HIVnr, RPRnr, UDS neg, Hgb 12.9, VZV immune, GCCT/trich neg, UCx no growth    - GTT/CBC sent today          Relevant Orders    CBC with Auto Differential    Glucose Challenge Gestational    TYPE AND SCREEN    Rh negative state in antepartum period     Rhogam 22 and PP work-up          Relevant Orders    TYPE AND SCREEN       Dispo: RTC in 2 weeks  Koby Marrero MD

## 2022-12-28 NOTE — PROGRESS NOTES
4:13 PM Given Tdap (Adacel) vaccine 0.5mL IM  Site:Right deltoid. Lot # [id number]  Expiration Date:  11/30/24  [facility] # [id number] . Patient tolerated well. No reaction noted after 20 minutes. VIS sheet provided. Administered by: Thom Kaur LPN     0:34 PM Given RhoGAM 300mcg IM  Blood Type:A neg  Current gestation: 28 weeks 3 days  Site:Right upper quad. gluteus. Lot # [id number]  Expiration Date:  02/25/2024  [facility] # [id number] . Patient tolerated well. No reaction noted after 20 minutes. ID card provided to patient.   Administered by: Thom Kaur LPN

## 2022-12-28 NOTE — ASSESSMENT & PLAN NOTE
- FWB: reassuring by lamont today  - Genetic screening: declined  - Anatomy scan: 11/4/22: 366g (39%ile), nl anatomy, anterior placenta/no previa, CL 3.32cm, it's a girl!    - 12/1/22: 680g (28%ile), nl anatomy  - Flu shot: discuss next visit  - Tdap: 12/28/22  - PNL: A-/ab-, RI, HepB/C neg, HIVnr, RPRnr, UDS neg, Hgb 12.9, VZV immune, GCCT/trich neg, UCx no growth    - GTT/CBC sent today

## 2022-12-29 LAB
ABO/RH: NORMAL
ANTIBODY SCREEN: NORMAL
BASOPHILS ABSOLUTE: 0 K/UL (ref 0–0.2)
BASOPHILS RELATIVE PERCENT: 0 %
EOSINOPHILS ABSOLUTE: 0 K/UL (ref 0–0.6)
EOSINOPHILS RELATIVE PERCENT: 0 %
GLUCOSE CHALLENGE: 116 MG/DL
HCT VFR BLD CALC: 30.8 % (ref 36–48)
HEMOGLOBIN: 10 G/DL (ref 12–16)
LYMPHOCYTES ABSOLUTE: 0.1 K/UL (ref 1–5.1)
LYMPHOCYTES RELATIVE PERCENT: 1 %
MCH RBC QN AUTO: 28.1 PG (ref 26–34)
MCHC RBC AUTO-ENTMCNC: 32.5 G/DL (ref 31–36)
MCV RBC AUTO: 86.4 FL (ref 80–100)
MONOCYTES ABSOLUTE: 0.4 K/UL (ref 0–1.3)
MONOCYTES RELATIVE PERCENT: 6 %
MYELOCYTE PERCENT: 1 %
NEUTROPHILS ABSOLUTE: 6.4 K/UL (ref 1.7–7.7)
NEUTROPHILS RELATIVE PERCENT: 92 %
OVALOCYTES: ABNORMAL
PDW BLD-RTO: 14.2 % (ref 12.4–15.4)
PLATELET # BLD: 146 K/UL (ref 135–450)
PMV BLD AUTO: 10.1 FL (ref 5–10.5)
POIKILOCYTES: ABNORMAL
RBC # BLD: 3.57 M/UL (ref 4–5.2)
SLIDE REVIEW: ABNORMAL
TEAR DROP CELLS: ABNORMAL
WBC # BLD: 6.9 K/UL (ref 4–11)

## 2023-01-12 ENCOUNTER — ROUTINE PRENATAL (OUTPATIENT)
Dept: OBGYN CLINIC | Age: 23
End: 2023-01-12

## 2023-01-12 VITALS
BODY MASS INDEX: 24.3 KG/M2 | HEART RATE: 127 BPM | DIASTOLIC BLOOD PRESSURE: 74 MMHG | WEIGHT: 146 LBS | SYSTOLIC BLOOD PRESSURE: 112 MMHG

## 2023-01-12 DIAGNOSIS — D69.6 BENIGN GESTATIONAL THROMBOCYTOPENIA IN THIRD TRIMESTER (HCC): ICD-10-CM

## 2023-01-12 DIAGNOSIS — O99.113 BENIGN GESTATIONAL THROMBOCYTOPENIA IN THIRD TRIMESTER (HCC): ICD-10-CM

## 2023-01-12 DIAGNOSIS — Z67.91 RH NEGATIVE STATE IN ANTEPARTUM PERIOD: ICD-10-CM

## 2023-01-12 DIAGNOSIS — Z34.93 PRENATAL CARE IN THIRD TRIMESTER: Primary | ICD-10-CM

## 2023-01-12 DIAGNOSIS — O26.899 RH NEGATIVE STATE IN ANTEPARTUM PERIOD: ICD-10-CM

## 2023-01-12 PROCEDURE — 0502F SUBSEQUENT PRENATAL CARE: CPT | Performed by: OBSTETRICS & GYNECOLOGY

## 2023-01-12 NOTE — ASSESSMENT & PLAN NOTE
- FWB: reassuring by lamont today  - Genetic screening: declined  - Anatomy scan: 11/4/22: 366g (39%ile), nl anatomy, anterior placenta/no previa, CL 3.32cm, it's a girl!    - 12/1/22: 680g (28%ile), nl anatomy  - Flu shot: discuss next visit  - Tdap: 12/28/22  - PNL: A-/ab-, RI, HepB/C neg, HIVnr, RPRnr, UDS neg, Hgb 12.9, VZV immune, GCCT/trich neg, UCx no growth    - 1hr gTT 116, Hgb 10.0 (on iron)

## 2023-01-12 NOTE — PROGRESS NOTES
Return OB Office Visit    CC:   Chief Complaint   Patient presents with    Routine Prenatal Visit       HPI:  Pt seen and examined. No concerns/complaints. Denies VB, LOF. +FM. Some increased cramps since last visit.    Maternal wellness questionnaire reviewed - no concerns today. Score 0.     Objective:  /74   Pulse (!) 127   Wt 146 lb (66.2 kg)   LMP 2022 (Approximate)   BMI 24.30 kg/m²   Gen: AO, NAD  Abd: Soft, NT  FHT: 152  FH: 30cm, unk by Leopolds  Ext: Mild LE edema    Assessment/Plan:  22 y.o.  at 30w4d (Estimated Date of Delivery: 3/19/23) presents for BRUCE appointment:     Problem List Items Addressed This Visit          Other    Prenatal care in third trimester - Primary     - FWB: reassuring by lamont today  - Genetic screening: declined  - Anatomy scan: 22: 366g (39%ile), nl anatomy, anterior placenta/no previa, CL 3.32cm, it's a girl!    - 22: 680g (28%ile), nl anatomy  - Flu shot: discuss next visit  - Tdap: 22  - PNL: A-/ab-, RI, HepB/C neg, HIVnr, RPRnr, UDS neg, Hgb 12.9, VZV immune, GCCT/trich neg, UCx no growth    - 1hr gTT 116, Hgb 10.0 (on iron)          Rh negative state in antepartum period     Rhogam 22 and PP work-up          Benign gestational thrombocytopenia in third trimester (HCC)     Platetets 146 at 28 weeks  - repeat next visit             Dispo: RTC in 2 weeks  Cecy Valerio MD

## 2023-01-12 NOTE — PROGRESS NOTES
Temp-97.9F infrared  Maternal emotional well being screening form completed and reviewed with patient. Current score is 0. Patient given referral to [address] [place] [address] ([phone]):  No

## 2023-01-23 ENCOUNTER — ROUTINE PRENATAL (OUTPATIENT)
Dept: OBGYN CLINIC | Age: 23
End: 2023-01-23
Payer: COMMERCIAL

## 2023-01-23 VITALS
WEIGHT: 148.6 LBS | BODY MASS INDEX: 24.73 KG/M2 | SYSTOLIC BLOOD PRESSURE: 108 MMHG | HEART RATE: 104 BPM | DIASTOLIC BLOOD PRESSURE: 66 MMHG

## 2023-01-23 DIAGNOSIS — D69.6 BENIGN GESTATIONAL THROMBOCYTOPENIA IN THIRD TRIMESTER (HCC): ICD-10-CM

## 2023-01-23 DIAGNOSIS — O26.899 RH NEGATIVE STATE IN ANTEPARTUM PERIOD: ICD-10-CM

## 2023-01-23 DIAGNOSIS — O99.113 BENIGN GESTATIONAL THROMBOCYTOPENIA IN THIRD TRIMESTER (HCC): ICD-10-CM

## 2023-01-23 DIAGNOSIS — Z67.91 RH NEGATIVE STATE IN ANTEPARTUM PERIOD: ICD-10-CM

## 2023-01-23 DIAGNOSIS — Z34.93 PRENATAL CARE IN THIRD TRIMESTER: Primary | ICD-10-CM

## 2023-01-23 LAB
BASOPHILS ABSOLUTE: 0 K/UL (ref 0–0.2)
BASOPHILS RELATIVE PERCENT: 0.4 %
EOSINOPHILS ABSOLUTE: 0.1 K/UL (ref 0–0.6)
EOSINOPHILS RELATIVE PERCENT: 0.7 %
HCT VFR BLD CALC: 33.3 % (ref 36–48)
HEMOGLOBIN: 10.4 G/DL (ref 12–16)
LYMPHOCYTES ABSOLUTE: 1.1 K/UL (ref 1–5.1)
LYMPHOCYTES RELATIVE PERCENT: 13.4 %
MCH RBC QN AUTO: 27.4 PG (ref 26–34)
MCHC RBC AUTO-ENTMCNC: 31.1 G/DL (ref 31–36)
MCV RBC AUTO: 88.2 FL (ref 80–100)
MONOCYTES ABSOLUTE: 0.5 K/UL (ref 0–1.3)
MONOCYTES RELATIVE PERCENT: 5.5 %
NEUTROPHILS ABSOLUTE: 6.8 K/UL (ref 1.7–7.7)
NEUTROPHILS RELATIVE PERCENT: 80 %
PDW BLD-RTO: 16.3 % (ref 12.4–15.4)
PLATELET # BLD: 149 K/UL (ref 135–450)
PMV BLD AUTO: 9.2 FL (ref 5–10.5)
RBC # BLD: 3.78 M/UL (ref 4–5.2)
WBC # BLD: 8.5 K/UL (ref 4–11)

## 2023-01-23 PROCEDURE — 36415 COLL VENOUS BLD VENIPUNCTURE: CPT | Performed by: OBSTETRICS & GYNECOLOGY

## 2023-01-23 PROCEDURE — 0502F SUBSEQUENT PRENATAL CARE: CPT | Performed by: OBSTETRICS & GYNECOLOGY

## 2023-01-23 NOTE — PROGRESS NOTES
Temp-97.9F infrared  Maternal emotional well being screening form completed and reviewed with patient. Current score is 0. Patient given referral to [address] [place] [address] ([phone]):  No

## 2023-01-23 NOTE — PROGRESS NOTES
Return OB Office Visit    CC:   Chief Complaint   Patient presents with    Routine Prenatal Visit       HPI:  Pt seen and examined. No concerns/complaints. Denies VB, LOF, ctx. +FM. Maternal wellness questionnaire reviewed - no concerns today. Score 0.      Objective:  /66   Pulse (!) 104   Wt 148 lb 9.6 oz (67.4 kg)   LMP 2022 (Approximate)   BMI 24.73 kg/m²   Gen: AO, NAD  Abd: Soft, NT  FHT: 139  FH: 31cm, unk by Leopolds  Ext: Mild LE edema    Assessment/Plan:  25 y.o.  at 32w1d (Estimated Date of Delivery: 3/19/23) presents for BRUCE appointment:     Problem List Items Addressed This Visit          Other    Prenatal care in third trimester - Primary     - FWB: reassuring by lamont today  - Genetic screening: declined  - Anatomy scan: 22: 366g (39%ile), nl anatomy, anterior placenta/no previa, CL 3.32cm, it's a girl!    - 22: 680g (28%ile), nl anatomy  - Flu shot: discuss next visit  - Tdap: 22  - PNL: A-/ab-, RI, HepB/C neg, HIVnr, RPRnr, UDS neg, Hgb 12.9, VZV immune, GCCT/trich neg, UCx no growth    - 1hr gTT 116, Hgb 10.0 (on iron)          Rh negative state in antepartum period     Rhogam 22 and PP work-up          Benign gestational thrombocytopenia in third trimester ([facility] [address]     Platetets 519 at 28 weeks  - repeated today          Relevant Orders    CBC with Auto Differential (Completed)       Dispo: RTC in 2 weeks  Regina Riddle MD

## 2023-01-27 ENCOUNTER — TELEPHONE (OUTPATIENT)
Dept: OBGYN CLINIC | Age: 23
End: 2023-01-27

## 2023-01-27 NOTE — TELEPHONE ENCOUNTER
Patient calling d/t this morning while folding laundry she got pale, shaky, heart started to race, unable to catch her breath, and felt dizzy. She did have waffles for breakfast and a glass of water. It took her about an hour to catch her breath. Positive fetal movement. Per Dr. Romayne Messenger have patient eat more frequently with well balanced meal and snack with protein in between meals. Patient voiced understanding. Reviewed kick counts for fetal movement if there is concern with baby. Routing to Dr. Romayne Messenger as Spencer Martinez.

## 2023-02-02 ENCOUNTER — HOSPITAL ENCOUNTER (OUTPATIENT)
Age: 23
Discharge: HOME | End: 2023-02-02
Attending: OBSTETRICS & GYNECOLOGY | Admitting: OBSTETRICS & GYNECOLOGY
Payer: COMMERCIAL

## 2023-02-02 VITALS
DIASTOLIC BLOOD PRESSURE: 77 MMHG | SYSTOLIC BLOOD PRESSURE: 125 MMHG | HEART RATE: 120 BPM | HEIGHT: 65 IN | WEIGHT: 148 LBS | TEMPERATURE: 98.9 F | BODY MASS INDEX: 24.66 KG/M2

## 2023-02-02 PROCEDURE — 99211 OFF/OP EST MAY X REQ PHY/QHP: CPT

## 2023-02-03 NOTE — H&P
Department of Obstetrics and Gynecology  Labor and Delivery   Attending Progress Note      SUBJECTIVE:  Patient presents to triage with complaints of \"gush of fluid\" at 5:30 pm.  Denies contractions but does complain of back and vaginal discomfort. No recent intercourse. OBJECTIVE:      Fetal heart rate:       Baseline Heart Rate:  140        Accelerations:  present       Long Term Variability:  moderate       Decelerations:  absent         Contraction frequency: 0 minutes    Membranes:  Intact    Cervix:         Dilation:  FT         Effacement:  50%         Station:  -3         Position:  posterior    SSE: no pooling of fluid, no leakage of fluid with valsalva  Fern negative             ASSESSMENT & PLAN:    25 y.o.    OB History          3    Para   1    Term   1            AB   1    Living   1         SAB        IAB        Ectopic        Molar   1    Multiple   0    Live Births   1             33w4d  1. FWB: reassuring  2. Nurse to notify provider. Not grossly ruptured.

## 2023-02-03 NOTE — PROGRESS NOTES
Pt presents to triage after stating around [phone] was leaving grocery store and noted her pants to be soaked with leaking down her leg, went home at that time to pack things up for hospital and needed to change her panties a few times since then. +FM but decreased today from normal, denies VB. Placed on EFM.

## 2023-02-03 NOTE — PROGRESS NOTES
Notified Dr. Diane Hawley of pt's arrival, orders received to have [facility] and check pt.

## 2023-02-03 NOTE — DISCHARGE INSTRUCTIONS
Diet/Activity/Restrictions:  Regular  Limit caffeine consumption  Increase your fluid intake  Eat small meals-but often. Rise from laying/sitting position to standing slowly. Avoid laying on your back, sidelying positions are best, left side is preferred. Weigh yourself daily and write down what you weigh. If you had a vaginal exam you may have some bloody mucous or brown vaginal discharge. When to call your doctor: If you have severe back pain. Period-like cramps that may come and go. May feel like baby is balling up. Low, dull backache, not relieved by rest.  Pressure in your vagina or lower abdomen that may feel like the baby is pushing down. Change in the type or amount of vaginal discharge. Abdominal cramps that may be accompanied by diarrhea. 4-5 uterine contractions in one hour. Fluid leaking from your vagina (may or may not be bloody)  If you have vaginal bleeding. If you have a temperature of 100.6 or more. If your baby has a decrease in activity. Less than 5 times in an hour. If you feel you are not getting better or you are concerned. If you develop a headache, not resolved with your usual interventions. If you have changes in your vision (blurring or spots in your vision). If you have burning with urination, urgency or frequency. If you have pain in your abdomen, up by your ribs. Nausea and Vomiting  Keep dry toast/crackers with you to munch on  Eat small frequent meals  Try to keep something in your stomach (don't let your stomach get empty)  Take your time getting up  Avoid smells that make you feel sick    Travel  Wear seatbelts or safety/lap belts  Walk frequently, every 1-2 hours  Wear clothing that does not constrict and comfortable shoes  Keep a light snack (e.g. Dry crackers) with you at all times to prevent nausea  Drink plenty of water, low sodium and noncaffeinated drinks.   DO NOT take any medication that is not approved by your physician first    Swelling (Edema)  Avoid standing for long periods, keep legs up when you can  When resting, lie on your side (left side is best)  Limit the amount of salty foods you eat  Try to wear support hose as much as possible    Exercise  Avoid situations that would cause you to become overheated  Exercise outdoors only if the weather is reasonable and not too hot  Do not over exert yourself when you exercise  Drink plenty of fluids, especially water  Wear good support hose, bra and shoes when exercising    Varicose Veins  Try to keep your legs elevated when you are sitting  When lying down, keep your legs elevated  Do not stand for long periods of time  When wearing stockings or socks, make sure they are not too tight and bind your legs  Wear support hose/stockings at all times  If you have a job where you sit a lot, get up periodically and walk around

## 2023-02-03 NOTE — PROGRESS NOTES
Updated Dr. Olga Salinas on FERN negative, SVE FT/50%, Cat 1 tracing with palpated irritability as pt noted some tightening and back pain. Orders received to discharge home.

## 2023-02-07 ENCOUNTER — ROUTINE PRENATAL (OUTPATIENT)
Dept: OBGYN CLINIC | Age: 23
End: 2023-02-07

## 2023-02-07 VITALS
HEART RATE: 127 BPM | WEIGHT: 152.2 LBS | SYSTOLIC BLOOD PRESSURE: 128 MMHG | BODY MASS INDEX: 25.33 KG/M2 | DIASTOLIC BLOOD PRESSURE: 74 MMHG

## 2023-02-07 DIAGNOSIS — O99.113 BENIGN GESTATIONAL THROMBOCYTOPENIA IN THIRD TRIMESTER (HCC): ICD-10-CM

## 2023-02-07 DIAGNOSIS — D69.6 BENIGN GESTATIONAL THROMBOCYTOPENIA IN THIRD TRIMESTER (HCC): ICD-10-CM

## 2023-02-07 DIAGNOSIS — Z67.91 RH NEGATIVE STATE IN ANTEPARTUM PERIOD: ICD-10-CM

## 2023-02-07 DIAGNOSIS — R00.0 TACHYCARDIA: ICD-10-CM

## 2023-02-07 DIAGNOSIS — O26.899 RH NEGATIVE STATE IN ANTEPARTUM PERIOD: ICD-10-CM

## 2023-02-07 DIAGNOSIS — Z34.93 PRENATAL CARE IN THIRD TRIMESTER: Primary | ICD-10-CM

## 2023-02-07 PROCEDURE — 0502F SUBSEQUENT PRENATAL CARE: CPT | Performed by: OBSTETRICS & GYNECOLOGY

## 2023-02-07 NOTE — ASSESSMENT & PLAN NOTE
- FWB: reassuring by lamont today  - Genetic screening: declined  - Anatomy scan: 11/4/22: 366g (39%ile), nl anatomy, anterior placenta/no previa, CL 3.32cm, it's a girl!    - 12/1/22: 680g (28%ile), nl anatomy  - Tdap: 12/28/22  - PNL: A-/ab-, RI, HepB/C neg, HIVnr, RPRnr, UDS neg, Hgb 12.9, VZV immune, GCCT/trich neg, UCx no growth    - 1hr gTT 116, Hgb 10.0 (on iron)   - GBS next visit

## 2023-02-07 NOTE — PROGRESS NOTES
Return OB Office Visit    CC:   Chief Complaint   Patient presents with    Routine Prenatal Visit       HPI:  Pt seen and examined. No concerns/complaints. Denies VB, LOF, ctx. +FM. Was seen in triage last week for possible ROM, exam negative and discharged to home at that time.      Objective:  /74   Pulse (!) 127   Wt 152 lb 3.2 oz (69 kg)   LMP 2022 (Approximate)   BMI 25.33 kg/m²   Gen: AO, NAD  Abd: Soft, NT  FHT: 150  FH: 33cm, unk by Leopolds  Ext: Mild LE edema    Assessment/Plan:  25 y.o.  at 34w2d (Estimated Date of Delivery: 3/19/23) presents for BRUCE appointment:     Problem List Items Addressed This Visit          Other    Prenatal care in third trimester - Primary     - FWB: reassuring by lamont today  - Genetic screening: declined  - Anatomy scan: 22: 366g (39%ile), nl anatomy, anterior placenta/no previa, CL 3.32cm, it's a girl!    - 22: 680g (28%ile), nl anatomy  - Tdap: 22  - PNL: A-/ab-, RI, HepB/C neg, HIVnr, RPRnr, UDS neg, Hgb 12.9, VZV immune, GCCT/trich neg, UCx no growth    - 1hr gTT 116, Hgb 10.0 (on iron)   - GBS next visit          Rh negative state in antepartum period     Rhogam 22 and PP work-up          Benign gestational thrombocytopenia in third trimester ([facility] [address]     Platetets 291 at 28 weeks  - repeated 149k at 34 weeks           Other Visit Diagnoses       Tachycardia        Relevant Orders    Karma Roberson MD, Cardiology, [facility]            Dispo: RTC in 2 weeks  Dennie Shuck, MD

## 2023-02-20 ENCOUNTER — ROUTINE PRENATAL (OUTPATIENT)
Dept: OBGYN CLINIC | Age: 23
End: 2023-02-20

## 2023-02-20 VITALS — SYSTOLIC BLOOD PRESSURE: 128 MMHG | DIASTOLIC BLOOD PRESSURE: 74 MMHG | HEART RATE: 128 BPM

## 2023-02-20 DIAGNOSIS — D69.6 BENIGN GESTATIONAL THROMBOCYTOPENIA IN THIRD TRIMESTER (HCC): ICD-10-CM

## 2023-02-20 DIAGNOSIS — O99.113 BENIGN GESTATIONAL THROMBOCYTOPENIA IN THIRD TRIMESTER (HCC): ICD-10-CM

## 2023-02-20 DIAGNOSIS — Z34.93 PRENATAL CARE IN THIRD TRIMESTER: Primary | ICD-10-CM

## 2023-02-20 DIAGNOSIS — Z67.91 RH NEGATIVE STATE IN ANTEPARTUM PERIOD: ICD-10-CM

## 2023-02-20 DIAGNOSIS — O26.899 RH NEGATIVE STATE IN ANTEPARTUM PERIOD: ICD-10-CM

## 2023-02-20 PROCEDURE — 0502F SUBSEQUENT PRENATAL CARE: CPT | Performed by: OBSTETRICS & GYNECOLOGY

## 2023-02-20 NOTE — PROGRESS NOTES
Return OB Office Visit    CC:   Chief Complaint   Patient presents with    Routine Prenatal Visit       HPI:  Pt seen and examined. No concerns/complaints. Denies VB, LOF, ctx. +FM. Maternal wellness questionnaire reviewed - no concerns today.      Objective:  /74   Pulse (!) 128   LMP 2022 (Approximate)   Gen: AO, NAD  Abd: Soft, NT  FHT: 129  SVE: 50/-2    Assessment/Plan:  25 y.o.  at 36w1d (Estimated Date of Delivery: 3/19/23) presents for BRUCE appointment:     Problem List Items Addressed This Visit          Other    Prenatal care in third trimester - Primary     - FWB: reassuring by doptonangel today  - Genetic screening: declined  - Anatomy scan: 22: 366g (39%ile), nl anatomy, anterior placenta/no previa, CL 3.32cm, it's a girl!    - 22: 680g (28%ile), nl anatomy  - Tdap: 22  - PNL: A-/ab-, RI, HepB/C neg, HIVnr, RPRnr, UDS neg, Hgb 12.9, VZV immune, GCCT/trich neg, UCx no growth    - 1hr gTT 116, Hgb 10.0 (on iron)   - GBS neg          Relevant Orders    Culture, Strep B Screen, Vaginal/Rectal    Rh negative state in antepartum period     Rhogam 22 and PP work-up          Benign gestational thrombocytopenia in third trimester ([facility] [address]     Platetets 623 at 28 weeks  - repeated 149k at 34 weeks           Growth US : 2907g (56%ile), vertex    Dispo: RTC in 1 week  Guilherme Daley MD

## 2023-02-20 NOTE — ASSESSMENT & PLAN NOTE
- FWB: reassuring by lamont today  - Genetic screening: declined  - Anatomy scan: 11/4/22: 366g (39%ile), nl anatomy, anterior placenta/no previa, CL 3.32cm, it's a girl!    - 12/1/22: 680g (28%ile), nl anatomy  - Tdap: 12/28/22  - PNL: A-/ab-, RI, HepB/C neg, HIVnr, RPRnr, UDS neg, Hgb 12.9, VZV immune, GCCT/trich neg, UCx no growth    - 1hr gTT 116, Hgb 10.0 (on iron)   - GBS neg

## 2023-02-23 LAB — GROUP B STREP CULTURE: NORMAL

## 2023-02-27 ENCOUNTER — ROUTINE PRENATAL (OUTPATIENT)
Dept: OBGYN CLINIC | Age: 23
End: 2023-02-27

## 2023-02-27 VITALS
BODY MASS INDEX: 25.96 KG/M2 | DIASTOLIC BLOOD PRESSURE: 74 MMHG | SYSTOLIC BLOOD PRESSURE: 124 MMHG | HEART RATE: 102 BPM | WEIGHT: 156 LBS

## 2023-02-27 DIAGNOSIS — O99.113 BENIGN GESTATIONAL THROMBOCYTOPENIA IN THIRD TRIMESTER (HCC): ICD-10-CM

## 2023-02-27 DIAGNOSIS — Z34.93 PRENATAL CARE IN THIRD TRIMESTER: Primary | ICD-10-CM

## 2023-02-27 DIAGNOSIS — D69.6 BENIGN GESTATIONAL THROMBOCYTOPENIA IN THIRD TRIMESTER (HCC): ICD-10-CM

## 2023-02-27 DIAGNOSIS — O26.899 RH NEGATIVE STATE IN ANTEPARTUM PERIOD: ICD-10-CM

## 2023-02-27 DIAGNOSIS — Z67.91 RH NEGATIVE STATE IN ANTEPARTUM PERIOD: ICD-10-CM

## 2023-02-27 PROCEDURE — 0502F SUBSEQUENT PRENATAL CARE: CPT | Performed by: OBSTETRICS & GYNECOLOGY

## 2023-02-27 NOTE — PROGRESS NOTES
TEMP-98.7f INFRARED  Maternal emotional well being screening form completed and reviewed with patient. Current score is 0. Patient given referral to [facility] ([phone]):  No

## 2023-02-27 NOTE — PROGRESS NOTES
Return OB Office Visit    CC:   Chief Complaint   Patient presents with    Routine Prenatal Visit       HPI:  Pt seen and examined. No concerns/complaints. Denies VB, LOF, +FM. +cramps.    Maternal wellness questionnaire reviewed - no concerns today. Score 0.     Objective:  /74   Pulse (!) 102   Wt 156 lb (70.8 kg)   LMP 2022 (Approximate)   BMI 25.96 kg/m²   Gen: AO, NAD  Abd: Soft, NT  FHT: 154  FH: 35cm, vertex by Loydads  Ext: Mild LE edema  SVE: 3/50/-2    Assessment/Plan:  22 y.o.  at 37w1d (Estimated Date of Delivery: 3/19/23) presents for BRUCE appointment:     Problem List Items Addressed This Visit          Other    Prenatal care in third trimester - Primary     - FWB: reassuring by lamont today  - Genetic screening: declined  - Anatomy scan: 22: 366g (39%ile), nl anatomy, anterior placenta/no previa, CL 3.32cm, it's a girl!    - 22: 680g (28%ile), nl anatomy  - Tdap: 22  - PNL: A-/ab-, RI, HepB/C neg, HIVnr, RPRnr, UDS neg, Hgb 12.9, VZV immune, GCCT/trich neg, UCx no growth    - 1hr gTT 116, Hgb 10.0 (on iron)   - GBS neg    eIOL Scheduled 3/14/23 at 0800 -- pitocin/AROM          Rh negative state in antepartum period     Rhogam 22 and PP work-up          Benign gestational thrombocytopenia in third trimester (HCC)     Platetets 146 at 28 weeks  - repeated 149k at 34 weeks             Dispo: RTC in 1 week  Cecy Valerio MD

## 2023-02-27 NOTE — ASSESSMENT & PLAN NOTE
- FWB: reassuring by lamont today  - Genetic screening: declined  - Anatomy scan: 11/4/22: 366g (39%ile), nl anatomy, anterior placenta/no previa, CL 3.32cm, it's a girl!    - 12/1/22: 680g (28%ile), nl anatomy  - Tdap: 12/28/22  - PNL: A-/ab-, RI, HepB/C neg, HIVnr, RPRnr, UDS neg, Hgb 12.9, VZV immune, GCCT/trich neg, UCx no growth    - 1hr gTT 116, Hgb 10.0 (on iron)   - GBS neg    eIOL Scheduled 3/14/23 at 0800 -- pitocin/AROM

## 2023-03-02 ENCOUNTER — TELEPHONE (OUTPATIENT)
Dept: OBGYN CLINIC | Age: 23
End: 2023-03-02

## 2023-03-02 NOTE — TELEPHONE ENCOUNTER
Pt states she may have passed her mucous plug, pt is having mild contractions since last night, pt lower back is aching. Pt states no loss of fluids at this time. Pt states contractions have been about 8 minutes apart since last night. Informed pt to continue to monitor and time contractions at this time and if she feels any gush of fluids or contractions become painful and closer together to go to L&D triage for evaluation.   Please Advise

## 2023-03-07 ENCOUNTER — ROUTINE PRENATAL (OUTPATIENT)
Dept: OBGYN CLINIC | Age: 23
End: 2023-03-07

## 2023-03-07 VITALS
WEIGHT: 154 LBS | BODY MASS INDEX: 25.63 KG/M2 | HEART RATE: 124 BPM | SYSTOLIC BLOOD PRESSURE: 116 MMHG | DIASTOLIC BLOOD PRESSURE: 88 MMHG

## 2023-03-07 DIAGNOSIS — D69.6 BENIGN GESTATIONAL THROMBOCYTOPENIA IN THIRD TRIMESTER (HCC): ICD-10-CM

## 2023-03-07 DIAGNOSIS — O99.113 BENIGN GESTATIONAL THROMBOCYTOPENIA IN THIRD TRIMESTER (HCC): ICD-10-CM

## 2023-03-07 DIAGNOSIS — Z34.93 PRENATAL CARE IN THIRD TRIMESTER: Primary | ICD-10-CM

## 2023-03-07 DIAGNOSIS — O26.899 RH NEGATIVE STATE IN ANTEPARTUM PERIOD: ICD-10-CM

## 2023-03-07 DIAGNOSIS — Z67.91 RH NEGATIVE STATE IN ANTEPARTUM PERIOD: ICD-10-CM

## 2023-03-07 PROCEDURE — 0502F SUBSEQUENT PRENATAL CARE: CPT | Performed by: OBSTETRICS & GYNECOLOGY

## 2023-03-07 NOTE — PROGRESS NOTES
Return OB Office Visit    CC:   Chief Complaint   Patient presents with    Routine Prenatal Visit       HPI:  Pt seen and examined. No concerns/complaints. Denies VB, LOF. +FM. Some contractions, nothing regular or consistent.      Objective:  /88   Pulse (!) 124   Wt 154 lb (69.9 kg)   LMP 2022 (Approximate)   BMI 25.63 kg/m²   Gen: AO, NAD  Abd: Soft, NT  FHT: 141  FH: 37cm, vertex by Nayeopolds  Ext: Mild LE edema    Assessment/Plan:  25 y.o.  at 38w2d (Estimated Date of Delivery: 3/19/23) presents for BRUCE appointment:     Problem List Items Addressed This Visit          Other    Prenatal care in third trimester - Primary     - FWB: reassuring by lamont today  - Genetic screening: declined  - Anatomy scan: 22: 366g (39%ile), nl anatomy, anterior placenta/no previa, CL 3.32cm, it's a girl!    - 22: 680g (28%ile), nl anatomy  - Tdap: 22  - PNL: A-/ab-, RI, HepB/C neg, HIVnr, RPRnr, UDS neg, Hgb 12.9, VZV immune, GCCT/trich neg, UCx no growth    - 1hr gTT 116, Hgb 10.0 (on iron)   - GBS neg    eIOL Scheduled 3/14/23 at 0800 -- pitocin/AROM          Rh negative state in antepartum period     Rhogam 22 and PP work-up          Benign gestational thrombocytopenia in third trimester ([address]     Platetets 275 at 28 weeks  - repeated 149k at 34 weeks             Dispo: RTC for PPV  Alek Seo MD

## 2023-03-12 ENCOUNTER — HOSPITAL ENCOUNTER (INPATIENT)
Age: 23
LOS: 2 days | Discharge: HOME | End: 2023-03-14
Attending: OBSTETRICS & GYNECOLOGY | Admitting: OBSTETRICS & GYNECOLOGY
Payer: COMMERCIAL

## 2023-03-12 ENCOUNTER — ANESTHESIA (OUTPATIENT)
Dept: LABOR AND DELIVERY | Age: 23
End: 2023-03-12
Payer: COMMERCIAL

## 2023-03-12 ENCOUNTER — ANESTHESIA EVENT (OUTPATIENT)
Dept: LABOR AND DELIVERY | Age: 23
End: 2023-03-12
Payer: COMMERCIAL

## 2023-03-12 PROBLEM — Z37.9 NORMAL LABOR: Status: ACTIVE | Noted: 2023-03-12

## 2023-03-12 LAB
A/G RATIO: 1.4 (ref 1.1–2.2)
ABO/RH: NORMAL
ALBUMIN SERPL-MCNC: 3.8 G/DL (ref 3.4–5)
ALP BLD-CCNC: 149 U/L (ref 40–129)
ALT SERPL-CCNC: 14 U/L (ref 10–40)
AMPHETAMINE SCREEN, URINE: NORMAL
ANION GAP SERPL CALCULATED.3IONS-SCNC: 14 MMOL/L (ref 3–16)
ANTIBODY IDENTIFICATION: NORMAL
ANTIBODY SCREEN: NORMAL
AST SERPL-CCNC: 24 U/L (ref 15–37)
BARBITURATE SCREEN URINE: NORMAL
BASOPHILS ABSOLUTE: 0 K/UL (ref 0–0.2)
BASOPHILS RELATIVE PERCENT: 0.5 %
BENZODIAZEPINE SCREEN, URINE: NORMAL
BILIRUB SERPL-MCNC: 0.5 MG/DL (ref 0–1)
BUN BLDV-MCNC: 8 MG/DL (ref 7–20)
BUPRENORPHINE URINE: NORMAL
CALCIUM SERPL-MCNC: 9.4 MG/DL (ref 8.3–10.6)
CANNABINOID SCREEN URINE: NORMAL
CHLORIDE BLD-SCNC: 103 MMOL/L (ref 99–110)
CO2: 20 MMOL/L (ref 21–32)
COCAINE METABOLITE SCREEN URINE: NORMAL
CREAT SERPL-MCNC: <0.5 MG/DL (ref 0.6–1.1)
DAT IGG CAPTURE: NORMAL
EOSINOPHILS ABSOLUTE: 0 K/UL (ref 0–0.6)
EOSINOPHILS RELATIVE PERCENT: 0.5 %
FENTANYL SCREEN, URINE: NORMAL
GFR SERPL CREATININE-BSD FRML MDRD: >60 ML/MIN/{1.73_M2}
GLUCOSE BLD-MCNC: 101 MG/DL (ref 70–99)
HCT VFR BLD CALC: 35.1 % (ref 36–48)
HEMOGLOBIN: 11.6 G/DL (ref 12–16)
LYMPHOCYTES ABSOLUTE: 1.5 K/UL (ref 1–5.1)
LYMPHOCYTES RELATIVE PERCENT: 17.6 %
Lab: NORMAL
MCH RBC QN AUTO: 27.3 PG (ref 26–34)
MCHC RBC AUTO-ENTMCNC: 33.2 G/DL (ref 31–36)
MCV RBC AUTO: 82.3 FL (ref 80–100)
METHADONE SCREEN, URINE: NORMAL
MONOCYTES ABSOLUTE: 0.5 K/UL (ref 0–1.3)
MONOCYTES RELATIVE PERCENT: 6 %
NEUTROPHILS ABSOLUTE: 6.4 K/UL (ref 1.7–7.7)
NEUTROPHILS RELATIVE PERCENT: 75.4 %
OPIATE SCREEN URINE: NORMAL
OXYCODONE URINE: NORMAL
PDW BLD-RTO: 17 % (ref 12.4–15.4)
PH UA: 6
PHENCYCLIDINE SCREEN URINE: NORMAL
PLATELET # BLD: 122 K/UL (ref 135–450)
PMV BLD AUTO: 8.9 FL (ref 5–10.5)
POTASSIUM SERPL-SCNC: 3.6 MMOL/L (ref 3.5–5.1)
RBC # BLD: 4.27 M/UL (ref 4–5.2)
SODIUM BLD-SCNC: 137 MMOL/L (ref 136–145)
TOTAL PROTEIN: 6.5 G/DL (ref 6.4–8.2)
WBC # BLD: 8.4 K/UL (ref 4–11)

## 2023-03-12 PROCEDURE — 2580000003 HC RX 258: Performed by: OBSTETRICS & GYNECOLOGY

## 2023-03-12 PROCEDURE — 80053 COMPREHEN METABOLIC PANEL: CPT

## 2023-03-12 PROCEDURE — 51701 INSERT BLADDER CATHETER: CPT

## 2023-03-12 PROCEDURE — 3700000000 HC ANESTHESIA ATTENDED CARE: Performed by: OBSTETRICS & GYNECOLOGY

## 2023-03-12 PROCEDURE — 6370000000 HC RX 637 (ALT 250 FOR IP): Performed by: OBSTETRICS & GYNECOLOGY

## 2023-03-12 PROCEDURE — 3700000001 HC ADD 15 MINUTES (ANESTHESIA): Performed by: OBSTETRICS & GYNECOLOGY

## 2023-03-12 PROCEDURE — 1220000000 HC SEMI PRIVATE OB R&B

## 2023-03-12 PROCEDURE — 2500000003 HC RX 250 WO HCPCS

## 2023-03-12 PROCEDURE — 6360000002 HC RX W HCPCS

## 2023-03-12 PROCEDURE — 85025 COMPLETE CBC W/AUTO DIFF WBC: CPT

## 2023-03-12 PROCEDURE — 86780 TREPONEMA PALLIDUM: CPT

## 2023-03-12 PROCEDURE — 80307 DRUG TEST PRSMV CHEM ANLYZR: CPT

## 2023-03-12 PROCEDURE — 7100000001 HC PACU RECOVERY - ADDTL 15 MIN: Performed by: OBSTETRICS & GYNECOLOGY

## 2023-03-12 PROCEDURE — 2500000003 HC RX 250 WO HCPCS: Performed by: NURSE ANESTHETIST, CERTIFIED REGISTERED

## 2023-03-12 PROCEDURE — 86900 BLOOD TYPING SEROLOGIC ABO: CPT

## 2023-03-12 PROCEDURE — 3609079900 HC CESAREAN SECTION: Performed by: OBSTETRICS & GYNECOLOGY

## 2023-03-12 PROCEDURE — 2580000003 HC RX 258

## 2023-03-12 PROCEDURE — 86870 RBC ANTIBODY IDENTIFICATION: CPT

## 2023-03-12 PROCEDURE — 10907ZC DRAINAGE OF AMNIOTIC FLUID, THERAPEUTIC FROM PRODUCTS OF CONCEPTION, VIA NATURAL OR ARTIFICIAL OPENING: ICD-10-PCS | Performed by: OBSTETRICS & GYNECOLOGY

## 2023-03-12 PROCEDURE — 6360000002 HC RX W HCPCS: Performed by: OBSTETRICS & GYNECOLOGY

## 2023-03-12 PROCEDURE — 86850 RBC ANTIBODY SCREEN: CPT

## 2023-03-12 PROCEDURE — 2709999900 HC NON-CHARGEABLE SUPPLY: Performed by: OBSTETRICS & GYNECOLOGY

## 2023-03-12 PROCEDURE — 86901 BLOOD TYPING SEROLOGIC RH(D): CPT

## 2023-03-12 PROCEDURE — 59510 CESAREAN DELIVERY: CPT | Performed by: OBSTETRICS & GYNECOLOGY

## 2023-03-12 PROCEDURE — 7100000000 HC PACU RECOVERY - FIRST 15 MIN: Performed by: OBSTETRICS & GYNECOLOGY

## 2023-03-12 PROCEDURE — 3700000025 EPIDURAL BLOCK: Performed by: ANESTHESIOLOGY

## 2023-03-12 PROCEDURE — 86880 COOMBS TEST DIRECT: CPT

## 2023-03-12 RX ORDER — ONDANSETRON 2 MG/ML
INJECTION INTRAMUSCULAR; INTRAVENOUS PRN
Status: DISCONTINUED | OUTPATIENT
Start: 2023-03-12 | End: 2023-03-15 | Stop reason: SDUPTHER

## 2023-03-12 RX ORDER — OXYTOCIN 10 [USP'U]/ML
INJECTION, SOLUTION INTRAMUSCULAR; INTRAVENOUS PRN
Status: DISCONTINUED | OUTPATIENT
Start: 2023-03-12 | End: 2023-03-15 | Stop reason: SDUPTHER

## 2023-03-12 RX ORDER — SODIUM CHLORIDE 0.9 % (FLUSH) 0.9 %
5-40 SYRINGE (ML) INJECTION EVERY 12 HOURS SCHEDULED
Status: DISCONTINUED | OUTPATIENT
Start: 2023-03-12 | End: 2023-03-12

## 2023-03-12 RX ORDER — SIMETHICONE 80 MG
80 TABLET,CHEWABLE ORAL EVERY 6 HOURS PRN
Status: DISCONTINUED | OUTPATIENT
Start: 2023-03-12 | End: 2023-03-14 | Stop reason: HOSPADM

## 2023-03-12 RX ORDER — LANOLIN 100 %
OINTMENT (GRAM) TOPICAL
Status: DISCONTINUED | OUTPATIENT
Start: 2023-03-12 | End: 2023-03-14 | Stop reason: HOSPADM

## 2023-03-12 RX ORDER — SODIUM CHLORIDE, SODIUM LACTATE, POTASSIUM CHLORIDE, CALCIUM CHLORIDE 600; 310; 30; 20 MG/100ML; MG/100ML; MG/100ML; MG/100ML
INJECTION, SOLUTION INTRAVENOUS CONTINUOUS
Status: DISCONTINUED | OUTPATIENT
Start: 2023-03-12 | End: 2023-03-14 | Stop reason: HOSPADM

## 2023-03-12 RX ORDER — SODIUM CHLORIDE 0.9 % (FLUSH) 0.9 %
5-40 SYRINGE (ML) INJECTION PRN
Status: DISCONTINUED | OUTPATIENT
Start: 2023-03-12 | End: 2023-03-12

## 2023-03-12 RX ORDER — SODIUM CHLORIDE 0.9 % (FLUSH) 0.9 %
10 SYRINGE (ML) INJECTION PRN
Status: DISCONTINUED | OUTPATIENT
Start: 2023-03-12 | End: 2023-03-12

## 2023-03-12 RX ORDER — DIPHENHYDRAMINE HYDROCHLORIDE 50 MG/ML
25 INJECTION INTRAMUSCULAR; INTRAVENOUS EVERY 6 HOURS PRN
Status: DISCONTINUED | OUTPATIENT
Start: 2023-03-12 | End: 2023-03-14 | Stop reason: HOSPADM

## 2023-03-12 RX ORDER — DOCUSATE SODIUM 100 MG/1
100 CAPSULE, LIQUID FILLED ORAL 2 TIMES DAILY
Status: DISCONTINUED | OUTPATIENT
Start: 2023-03-12 | End: 2023-03-14 | Stop reason: HOSPADM

## 2023-03-12 RX ORDER — PROPOFOL 10 MG/ML
INJECTION, EMULSION INTRAVENOUS PRN
Status: DISCONTINUED | OUTPATIENT
Start: 2023-03-12 | End: 2023-03-15 | Stop reason: SDUPTHER

## 2023-03-12 RX ORDER — SODIUM CHLORIDE 0.9 % (FLUSH) 0.9 %
5-40 SYRINGE (ML) INJECTION PRN
Status: DISCONTINUED | OUTPATIENT
Start: 2023-03-12 | End: 2023-03-14 | Stop reason: HOSPADM

## 2023-03-12 RX ORDER — SODIUM CHLORIDE 9 MG/ML
INJECTION, SOLUTION INTRAVENOUS PRN
Status: DISCONTINUED | OUTPATIENT
Start: 2023-03-12 | End: 2023-03-14 | Stop reason: HOSPADM

## 2023-03-12 RX ORDER — OXYCODONE HYDROCHLORIDE 5 MG/1
10 TABLET ORAL EVERY 6 HOURS PRN
Status: DISCONTINUED | OUTPATIENT
Start: 2023-03-12 | End: 2023-03-14 | Stop reason: HOSPADM

## 2023-03-12 RX ORDER — ONDANSETRON 2 MG/ML
4 INJECTION INTRAMUSCULAR; INTRAVENOUS EVERY 6 HOURS PRN
Status: DISCONTINUED | OUTPATIENT
Start: 2023-03-12 | End: 2023-03-14 | Stop reason: HOSPADM

## 2023-03-12 RX ORDER — BUPIVACAINE HYDROCHLORIDE 2.5 MG/ML
INJECTION, SOLUTION EPIDURAL; INFILTRATION; INTRACAUDAL PRN
Status: DISCONTINUED | OUTPATIENT
Start: 2023-03-12 | End: 2023-03-15 | Stop reason: SDUPTHER

## 2023-03-12 RX ORDER — SODIUM CHLORIDE 9 MG/ML
25 INJECTION, SOLUTION INTRAVENOUS PRN
Status: DISCONTINUED | OUTPATIENT
Start: 2023-03-12 | End: 2023-03-12

## 2023-03-12 RX ORDER — ONDANSETRON 2 MG/ML
4 INJECTION INTRAMUSCULAR; INTRAVENOUS EVERY 6 HOURS PRN
Status: DISCONTINUED | OUTPATIENT
Start: 2023-03-12 | End: 2023-03-12

## 2023-03-12 RX ORDER — SODIUM CHLORIDE 9 MG/ML
INJECTION, SOLUTION INTRAVENOUS CONTINUOUS PRN
Status: DISCONTINUED | OUTPATIENT
Start: 2023-03-12 | End: 2023-03-15 | Stop reason: SDUPTHER

## 2023-03-12 RX ORDER — TRISODIUM CITRATE DIHYDRATE AND CITRIC ACID MONOHYDRATE 500; 334 MG/5ML; MG/5ML
30 SOLUTION ORAL ONCE
Status: DISCONTINUED | OUTPATIENT
Start: 2023-03-12 | End: 2023-03-12

## 2023-03-12 RX ORDER — FAMOTIDINE 10 MG/ML
20 INJECTION, SOLUTION INTRAVENOUS 2 TIMES DAILY
Status: DISCONTINUED | OUTPATIENT
Start: 2023-03-12 | End: 2023-03-14 | Stop reason: HOSPADM

## 2023-03-12 RX ORDER — SODIUM CHLORIDE, SODIUM LACTATE, POTASSIUM CHLORIDE, AND CALCIUM CHLORIDE .6; .31; .03; .02 G/100ML; G/100ML; G/100ML; G/100ML
500 INJECTION, SOLUTION INTRAVENOUS PRN
Status: DISCONTINUED | OUTPATIENT
Start: 2023-03-12 | End: 2023-03-12

## 2023-03-12 RX ORDER — SODIUM CHLORIDE, SODIUM LACTATE, POTASSIUM CHLORIDE, AND CALCIUM CHLORIDE .6; .31; .03; .02 G/100ML; G/100ML; G/100ML; G/100ML
1000 INJECTION, SOLUTION INTRAVENOUS PRN
Status: DISCONTINUED | OUTPATIENT
Start: 2023-03-12 | End: 2023-03-12

## 2023-03-12 RX ORDER — SODIUM CHLORIDE, SODIUM LACTATE, POTASSIUM CHLORIDE, AND CALCIUM CHLORIDE .6; .31; .03; .02 G/100ML; G/100ML; G/100ML; G/100ML
1000 INJECTION, SOLUTION INTRAVENOUS ONCE
Status: DISCONTINUED | OUTPATIENT
Start: 2023-03-12 | End: 2023-03-12

## 2023-03-12 RX ORDER — MORPHINE SULFATE 1 MG/ML
INJECTION, SOLUTION EPIDURAL; INTRATHECAL; INTRAVENOUS PRN
Status: DISCONTINUED | OUTPATIENT
Start: 2023-03-12 | End: 2023-03-15 | Stop reason: SDUPTHER

## 2023-03-12 RX ORDER — OXYCODONE HYDROCHLORIDE 5 MG/1
5 TABLET ORAL EVERY 6 HOURS PRN
Status: DISCONTINUED | OUTPATIENT
Start: 2023-03-12 | End: 2023-03-14 | Stop reason: HOSPADM

## 2023-03-12 RX ORDER — SODIUM CHLORIDE, SODIUM LACTATE, POTASSIUM CHLORIDE, CALCIUM CHLORIDE 600; 310; 30; 20 MG/100ML; MG/100ML; MG/100ML; MG/100ML
INJECTION, SOLUTION INTRAVENOUS CONTINUOUS
Status: DISCONTINUED | OUTPATIENT
Start: 2023-03-12 | End: 2023-03-12

## 2023-03-12 RX ORDER — SODIUM CHLORIDE 9 MG/ML
INJECTION, SOLUTION INTRAVENOUS PRN
Status: DISCONTINUED | OUTPATIENT
Start: 2023-03-12 | End: 2023-03-12

## 2023-03-12 RX ORDER — IBUPROFEN 600 MG/1
600 TABLET ORAL EVERY 8 HOURS SCHEDULED
Status: DISCONTINUED | OUTPATIENT
Start: 2023-03-12 | End: 2023-03-14 | Stop reason: HOSPADM

## 2023-03-12 RX ORDER — BUPIVACAINE HYDROCHLORIDE 5 MG/ML
INJECTION, SOLUTION EPIDURAL; INTRACAUDAL PRN
Status: DISCONTINUED | OUTPATIENT
Start: 2023-03-12 | End: 2023-03-15 | Stop reason: SDUPTHER

## 2023-03-12 RX ORDER — SODIUM CHLORIDE 0.9 % (FLUSH) 0.9 %
10 SYRINGE (ML) INJECTION EVERY 12 HOURS SCHEDULED
Status: DISCONTINUED | OUTPATIENT
Start: 2023-03-12 | End: 2023-03-12

## 2023-03-12 RX ORDER — FAMOTIDINE 10 MG/ML
20 INJECTION, SOLUTION INTRAVENOUS 2 TIMES DAILY
Status: DISCONTINUED | OUTPATIENT
Start: 2023-03-12 | End: 2023-03-12

## 2023-03-12 RX ORDER — SODIUM CHLORIDE 0.9 % (FLUSH) 0.9 %
5-40 SYRINGE (ML) INJECTION EVERY 12 HOURS SCHEDULED
Status: DISCONTINUED | OUTPATIENT
Start: 2023-03-12 | End: 2023-03-14 | Stop reason: HOSPADM

## 2023-03-12 RX ORDER — KETOROLAC TROMETHAMINE 30 MG/ML
30 INJECTION, SOLUTION INTRAMUSCULAR; INTRAVENOUS EVERY 6 HOURS
Status: DISPENSED | OUTPATIENT
Start: 2023-03-12 | End: 2023-03-13

## 2023-03-12 RX ORDER — LIDOCAINE HYDROCHLORIDE 10 MG/ML
INJECTION, SOLUTION EPIDURAL; INFILTRATION; INTRACAUDAL; PERINEURAL PRN
Status: DISCONTINUED | OUTPATIENT
Start: 2023-03-12 | End: 2023-03-15 | Stop reason: SDUPTHER

## 2023-03-12 RX ORDER — ACETAMINOPHEN 500 MG
1000 TABLET ORAL EVERY 8 HOURS SCHEDULED
Status: DISCONTINUED | OUTPATIENT
Start: 2023-03-12 | End: 2023-03-14 | Stop reason: HOSPADM

## 2023-03-12 RX ORDER — CARBOPROST TROMETHAMINE 250 UG/ML
250 INJECTION, SOLUTION INTRAMUSCULAR PRN
Status: DISCONTINUED | OUTPATIENT
Start: 2023-03-12 | End: 2023-03-12

## 2023-03-12 RX ORDER — KETOROLAC TROMETHAMINE 30 MG/ML
30 INJECTION, SOLUTION INTRAMUSCULAR; INTRAVENOUS EVERY 6 HOURS
Status: DISCONTINUED | OUTPATIENT
Start: 2023-03-12 | End: 2023-03-12

## 2023-03-12 RX ORDER — AZITHROMYCIN 500 MG/1
INJECTION, POWDER, LYOPHILIZED, FOR SOLUTION INTRAVENOUS
Status: DISCONTINUED
Start: 2023-03-12 | End: 2023-03-12

## 2023-03-12 RX ORDER — MISOPROSTOL 100 UG/1
800 TABLET ORAL PRN
Status: DISCONTINUED | OUTPATIENT
Start: 2023-03-12 | End: 2023-03-12

## 2023-03-12 RX ORDER — MORPHINE SULFATE 10 MG/ML
INJECTION, SOLUTION INTRAMUSCULAR; INTRAVENOUS PRN
Status: DISCONTINUED | OUTPATIENT
Start: 2023-03-12 | End: 2023-03-15 | Stop reason: SDUPTHER

## 2023-03-12 RX ORDER — METHYLERGONOVINE MALEATE 0.2 MG/ML
200 INJECTION INTRAVENOUS PRN
Status: DISCONTINUED | OUTPATIENT
Start: 2023-03-12 | End: 2023-03-12

## 2023-03-12 RX ORDER — CEFAZOLIN SODIUM IN 0.9 % NACL 2 G/100 ML
2000 PLASTIC BAG, INJECTION (ML) INTRAVENOUS ONCE
Status: COMPLETED | OUTPATIENT
Start: 2023-03-12 | End: 2023-03-12

## 2023-03-12 RX ADMIN — HYDROMORPHONE HYDROCHLORIDE 0.5 MG: 0.5 INJECTION, SOLUTION INTRAMUSCULAR; INTRAVENOUS; SUBCUTANEOUS at 17:34

## 2023-03-12 RX ADMIN — BUPIVACAINE HYDROCHLORIDE 5 ML: 2.5 INJECTION, SOLUTION EPIDURAL; INFILTRATION; INTRACAUDAL; PERINEURAL at 06:48

## 2023-03-12 RX ADMIN — SODIUM CHLORIDE, POTASSIUM CHLORIDE, SODIUM LACTATE AND CALCIUM CHLORIDE 1000 ML: 600; 310; 30; 20 INJECTION, SOLUTION INTRAVENOUS at 05:45

## 2023-03-12 RX ADMIN — AZITHROMYCIN MONOHYDRATE 500 MG: 500 INJECTION, POWDER, LYOPHILIZED, FOR SOLUTION INTRAVENOUS at 14:09

## 2023-03-12 RX ADMIN — PROPOFOL 50 MG: 10 INJECTION, EMULSION INTRAVENOUS at 14:30

## 2023-03-12 RX ADMIN — KETOROLAC TROMETHAMINE 30 MG: 30 INJECTION, SOLUTION INTRAMUSCULAR; INTRAVENOUS at 20:09

## 2023-03-12 RX ADMIN — Medication 2000 MG: at 14:30

## 2023-03-12 RX ADMIN — ONDANSETRON 4 MG: 2 INJECTION INTRAMUSCULAR; INTRAVENOUS at 14:38

## 2023-03-12 RX ADMIN — SODIUM CHLORIDE, POTASSIUM CHLORIDE, SODIUM LACTATE AND CALCIUM CHLORIDE: 600; 310; 30; 20 INJECTION, SOLUTION INTRAVENOUS at 19:59

## 2023-03-12 RX ADMIN — BUPIVACAINE HYDROCHLORIDE 5 ML: 5 INJECTION, SOLUTION EPIDURAL; INTRACAUDAL; PERINEURAL at 06:48

## 2023-03-12 RX ADMIN — SODIUM CHLORIDE, POTASSIUM CHLORIDE, SODIUM LACTATE AND CALCIUM CHLORIDE 125 ML/HR: 600; 310; 30; 20 INJECTION, SOLUTION INTRAVENOUS at 06:45

## 2023-03-12 RX ADMIN — MORPHINE SULFATE 3 MG: 1 INJECTION EPIDURAL; INTRATHECAL; INTRAVENOUS at 14:37

## 2023-03-12 RX ADMIN — LIDOCAINE HYDROCHLORIDE 15 ML: 10 INJECTION, SOLUTION EPIDURAL; INFILTRATION; INTRACAUDAL; PERINEURAL at 14:16

## 2023-03-12 RX ADMIN — DOCUSATE SODIUM 100 MG: 100 CAPSULE, LIQUID FILLED ORAL at 20:10

## 2023-03-12 RX ADMIN — MORPHINE SULFATE 4 MG: 10 INJECTION, SOLUTION INTRAMUSCULAR; INTRAVENOUS at 14:55

## 2023-03-12 RX ADMIN — SODIUM CHLORIDE, POTASSIUM CHLORIDE, SODIUM LACTATE AND CALCIUM CHLORIDE 1000 ML: 600; 310; 30; 20 INJECTION, SOLUTION INTRAVENOUS at 06:16

## 2023-03-12 RX ADMIN — PROPOFOL 20 MG: 10 INJECTION, EMULSION INTRAVENOUS at 14:45

## 2023-03-12 RX ADMIN — OXYTOCIN 20 UNITS: 10 INJECTION INTRAVENOUS at 14:44

## 2023-03-12 RX ADMIN — SODIUM CHLORIDE, POTASSIUM CHLORIDE, SODIUM LACTATE AND CALCIUM CHLORIDE: 600; 310; 30; 20 INJECTION, SOLUTION INTRAVENOUS at 14:01

## 2023-03-12 RX ADMIN — SODIUM CHLORIDE: 9 INJECTION, SOLUTION INTRAVENOUS at 14:30

## 2023-03-12 RX ADMIN — Medication 15 ML/HR: at 06:53

## 2023-03-12 RX ADMIN — Medication 500 ML: at 14:32

## 2023-03-12 ASSESSMENT — PAIN DESCRIPTION - LOCATION
LOCATION: ABDOMEN
LOCATION: ABDOMEN

## 2023-03-12 ASSESSMENT — PAIN DESCRIPTION - DESCRIPTORS
DESCRIPTORS: CRAMPING;SORE
DESCRIPTORS: SORE

## 2023-03-12 ASSESSMENT — PAIN SCALES - GENERAL
PAINLEVEL_OUTOF10: 7
PAINLEVEL_OUTOF10: 4

## 2023-03-12 ASSESSMENT — PAIN DESCRIPTION - ORIENTATION: ORIENTATION: MID;LOWER

## 2023-03-12 NOTE — PROGRESS NOTES
Patient to [id number] for admission. States contractions started 3/11/23 during the day and intensified throughout the evening. Denies vaginal bleeding, denies LOF and reports consistent fetal movement. EFM applied, #18 PIV placement in LFA and bolus for epidural started.

## 2023-03-12 NOTE — ANESTHESIA PROCEDURE NOTES
Epidural Block    Patient location during procedure: OB  Start time: 3/12/2023 6:40 AM  End time: 3/12/2023 6:42 AM  Reason for block: labor epidural  Staffing  Performed: resident/CRNA   Anesthesiologist: Dacia Encarnacion MD  Resident/CRNA: JOE Orantes CRNA  Epidural  Patient position: sitting  Prep: ChloraPrep  Patient monitoring: continuous pulse ox and frequent blood pressure checks  Approach: midline  Location: L3-4  Injection technique: REN air  Guidance: paresthesia technique  Provider prep: mask and sterile gloves  Needle  Needle type: Tuohy   Needle gauge: 17 G  Needle length: 3.5 in  Needle insertion depth: 5 cm  Catheter type: side hole  Catheter size: 19 G  Catheter at skin depth: 9 cm  Test dose: negativeCatheter Secured: tegaderm and tape  Assessment  Sensory level: T10  Hemodynamics: stable  Attempts: 1  Outcomes: uncomplicated and patient tolerated procedure well  Preanesthetic Checklist  Completed: patient identified, IV checked, site marked, risks and benefits discussed, equipment checked, pre-op evaluation, timeout performed, anesthesia consent given, oxygen available, monitors applied/VS acknowledged, fire risk safety assessment completed and verbalized and blood product R/B/A discussed and consented

## 2023-03-12 NOTE — PROGRESS NOTES
Dr. Santa Solomon notified of patient arrival and SVE. Orders for admission, labs and epidural received. Patient to [id number] for labor admission.

## 2023-03-12 NOTE — H&P
Obstetrics History and Physical    CC:   Chief Complaint   Patient presents with    Laboring       HPI:   Ramez Breen is a 25 y.o.  at 39w0d who presents with complaints of regular contractions. States they happened throughout the day yesterday and increased to every 5-7 minutes around 10PM.  Denies VB, LOF.  +FM. Denies chest pain, shortness of breath, fever, chills, nausea, vomiting. Denies headache, vision changes, RUQ pain, increased LE edema. Pregnancy has been complicated by persistent tachycardia (planning for Cardiology consult postpartum), Rh negative status, anemia and gestational thrombocytopenia. Review of Systems: The following ROS was otherwise negative, except as noted in the HPI: constitutional, HEENT, respiratory, cardiovascular, gastrointestinal, genitourinary, skin, musculoskeletal, neurological, psych    OBGYN Provider : Marv Hercules MD    Obstetrical History:  OB History    Para Term  AB Living   3 1 1 0 1 1   SAB IAB Ectopic Molar Multiple Live Births   0 0 0 1 0 1      # Outcome Date GA Lbr Lio/2nd Weight Sex Delivery Anes PTL Lv   3 Current            2 Term 21 39w1d 09:44 / 02:56 6 lb 9.1 oz (2.98 kg) F Vag-Spont EPI N ANGELIKA      Birth Comments: franco      Name: Ishmael Mendoza: 8  Apgar5: 9   1 Molar 20             Past Medical History:   Past Medical History:   Diagnosis Date    Anemia     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 2021    reports seeing specialist and specialist not concerned    Molar pregnancy     G1     Medications:  Prior to Admission medications    Medication Sig Start Date End Date Taking?  Authorizing Provider   ferrous sulfate (FE TABS 325) 325 (65 Fe) MG EC tablet Take 325 mg by mouth 3 times daily (with meals)    Historical Provider, MD   Prenatal MV & Min w/FA-DHA (PRENATAL ADULT GUMMY/DHA/FA PO) Take by mouth    Historical Provider, MD        Allergies:  Harlen Cowden [nitrofurantoin]    Surgical History:  Past Surgical History:   Procedure Laterality Date    DILATION AND CURETTAGE OF UTERUS N/A 05/26/2020    SUCTION DILATATION AND CURETTAGE performed by Luis Felipe Jane MD at [facility] History:  Family History   Problem Relation Age of Onset    Diabetes Paternal Grandfather     Stroke Paternal Grandfather     Diabetes Maternal Grandmother     Ovarian Cancer Father     Ovarian Cancer Mother     No Known Problems Brother     No Known Problems Sister     No Known Problems Sister     No Known Problems Sister      Social History:  Social History     Substance and Sexual Activity   Alcohol Use No     Social History     Substance and Sexual Activity   Drug Use No     Social History     Tobacco Use   Smoking Status Never   Smokeless Tobacco Never     Physical Exam:  /76   Pulse (!) 113   Temp 97.8 °F (36.6 °C) (Axillary)   Resp 18   Ht 5' 5\" (1.651 m)   Wt 154 lb (69.9 kg)   LMP 06/02/2022 (Approximate)   SpO2 100%   BMI 25.63 kg/m²     General: Alert, well appearing, no acute distress - comfortable s/p epidural  Head: Normocephalic, atraumatic  Lungs: Clear to auscultation bilaterally without rales, rhonchi, wheezing  CV: Regular rate and regular rhythm, normal S1, S2 without murmurs, rubs, clicks or gallops. Abdomen: Gravid, soft, non-tender, non-distended. No rebound or guarding. Contractions palpate moderate. No uterine tenderness to palpation  Pelvic: Normal appearing external genitalia without masses, tenderness or lesions. Cervix 6/90/0 with bulging membranes. Amniotomy performed without difficulty with clear fluid noted.   Vertex   Extremities: No redness or tenderness, neg Louann's sign  Skin: Well perfused, normal coloration and turgor, no lesions or rashes visualized  Neuro: Alert, oriented, normal speech, no focal deficits, moves extremities appropriately  Psych: Appropriate, normal affect, appears stated age    Fetal Monitoring Interpretation: Baseline: 155  Variability: moderate in degree  Accelerations: Present prior to epidural, absent currently, however with moderate variability  Decelerations: Absent                 [place]: Contractions are present every 2-3 minutes    Category 1    Reactive: Yes      Labs:  Admission on 03/12/2023   Component Date Value    WBC 03/12/2023 8.4     RBC 03/12/2023 4.27     Hemoglobin 03/12/2023 11.6 (A)     Hematocrit 03/12/2023 35.1 (A)     MCV 03/12/2023 82.3     MCH 03/12/2023 27.3     MCHC 03/12/2023 33.2     RDW 03/12/2023 17.0 (A)     Platelets 43/91/0082 122 (A)     MPV 03/12/2023 8.9     Neutrophils % 03/12/2023 75.4     Lymphocytes % 03/12/2023 17.6     Monocytes % 03/12/2023 6.0     Eosinophils % 03/12/2023 0.5     Basophils % 03/12/2023 0.5     Neutrophils Absolute 03/12/2023 6.4     Lymphocytes Absolute 03/12/2023 1.5     Monocytes Absolute 03/12/2023 0.5     Eosinophils Absolute 03/12/2023 0.0     Basophils Absolute 03/12/2023 0.0     Sodium 03/12/2023 137     Potassium 03/12/2023 3.6     Chloride 03/12/2023 103     CO2 03/12/2023 20 (A)     Anion Gap 03/12/2023 14     Glucose 03/12/2023 101 (A)     BUN 03/12/2023 8     Creatinine 03/12/2023 <0.5 (A)     Est, Glom Filt Rate 03/12/2023 >60     Calcium 03/12/2023 9.4     Total Protein 03/12/2023 6.5     Albumin 03/12/2023 3.8     Albumin/Globulin Ratio 03/12/2023 1.4     Total Bilirubin 03/12/2023 0.5     Alkaline Phosphatase 03/12/2023 149 (A)     ALT 03/12/2023 14     AST 03/12/2023 24     ABO/Rh 03/12/2023 A NEG     Antibody Screen 03/12/2023 POS     Amphetamine Screen, Urine 03/12/2023 Neg     Barbiturate Screen, Ur 03/12/2023 Neg     Benzodiazepine Screen, U* 03/12/2023 Neg     Cannabinoid Scrn, Ur 03/12/2023 Neg     Cocaine Metabolite Scree* 03/12/2023 Neg     Opiate Scrn, Ur 03/12/2023 Neg     PCP Screen, Urine 03/12/2023 Neg     Methadone Screen, Urine 03/12/2023 Neg     Oxycodone Urine 03/12/2023 Neg     Buprenorphine Urine 03/12/2023 Neg     pH, UA 2023 6.0     Drug Screen Comment: 2023 see below     FENTANYL SCREEN, URINE 2023 Neg     Antibody ID 2023 POS, PassiveAnti-D,Patient Rec'd RHIG     SCOTT IgG Capture 2023 NEG        Prenatal Labs    - Genetic screening: declined  - Anatomy scan: 22: 366g (39%ile), nl anatomy, anterior placenta/no previa, CL 3.32cm, it's a girl!  - 22: 680g (28%ile), nl anatomy  - Tdap: 22  - PNL: A-/ab-, RI, HepB/C neg, HIVnr, RPRnr, UDS neg, Hgb 12.9, VZV immune, GCCT/trich neg, UCx no growth    - 1hr gTT 116, Hgb 10.0 (on iron), Plts 146   - GBS neg    Assessment/Plan:   Kimberlee Cabello is a 25 y.o.  at 39w0d     Spontaneous labor     - Doing well s/p epidural     - s/p AROM with clear fluid     - Routine care    A neg / RI / GBS neg    Anemia during pregnancy     - 28 week Hgb 10.0     - Admission Hgb 11.6    Gestational thrombocytopenia     - Plts 112     - Did well with epidural    Elevated BP without diagnosis of HTN     - Initial /86     - Asymptomatic     - Repeats within normal limits     - HELLP labs: Plts 122, Cr <0.5, ALT/AST 14/24     - Urine PCR pending     - Will continue to closely follow     Tachycardia     - Mild tachycardia     - Denies pain or palpitations     - Will continue to closely follow     - Planning for Cardiology evaluation postpartum    Fetal wellbeing     - Reassuring     - Continuous fetal monitoring    Disposition:   In house for labor, delivery and postpartum care    Kathie Shah DO

## 2023-03-12 NOTE — PROGRESS NOTES
Labor Progress Note  [facility]    Subjective: Patient is seen and examined. Doing well, no concerns/complaints. Comfortable with epidural.     Objective:  /73   Pulse (!) 106   Temp 97.8 °F (36.6 °C) (Axillary)   Resp 18   Ht 5' 5\" (1.651 m)   Wt 154 lb (69.9 kg)   LMP 06/02/2022 (Approximate)   SpO2 100%   BMI 25.63 kg/m²     Cervix:  Dilation (cm): Lip/Rim (comment)  Effacement: 100  Cervical Characteristics: Soft  Station: +1  Presentation: Vertex  OB Examiner: Tasia Street RN    FHTs: 145 baseline, mod malina, +accels, neg decels  [place]: q2-3 min    Infusions:    lactated ringers IV soln 125 mL/hr at 03/12/23 0645    sodium chloride      oxytocin       ? Assessment/Plan:  1. Intrapartum  - Attempts to push past anterior lip - unable to at this time - will continue to monitor.  - FHTs reassuring   - Continue expectant mgmt.     Hui Vargas,

## 2023-03-12 NOTE — PROGRESS NOTES
Pt pushing now. RN remains in attendance at bedside throughout pushing and continually monitoring and observing FHR and maternal HR. Provider also at bedside for much of the time.

## 2023-03-12 NOTE — PROGRESS NOTES
Labor Progress Note  [facility]    Subjective:   Pushing at bedside with patient. Patient has been pushing for >2 hours with good maternal effort. Has tried several pushing positions and technique with absent fetal descent. Fetus noted in the extended left occiput posterior position with failed attempts to rotate. Patient reports significant fatigue with pushing. Objective:  /81   Pulse (!) 166   Temp 98.4 °F (36.9 °C) (Oral)   Resp 20   Ht 5' 5\" (1.651 m)   Wt 154 lb (69.9 kg)   LMP 2022 (Approximate)   SpO2 98%   BMI 25.63 kg/m²     Cervix:  Dilation (cm): 10  Dilation Complete Date: 23  Dilation Complete Time: 1100  Effacement: 100  Cervical Characteristics: Soft  Station: +1  Presentation: Vertex  OB Examiner: Zia Sharif RN    FHTs: 155 baseline, mod malina, +accels, neg decels  [place]: q2-3 min    Infusions:    lactated ringers IV soln 125 mL/hr at 23 1401    sodium chloride      oxytocin       ? Assessment/Plan:  1. Intrapartum  - FHTs reassuring   - Risks, benefits and alternatives were reviewed including continued pushing versus primary  delivery. Patient desires to proceed with primary low transverse  delivery.     - All questions were answered to the patient's satisfaction  - Consents are signed and in chart  - Will proceed to OR for [address] [place] [address]

## 2023-03-12 NOTE — L&D DELIVERY NOTE
Department of Obstetrics and Gynecology   Section Note    Indications: Arrested descent, persistent OP presentation    Pre-operative Diagnosis:  Francesca Espinal is a 25 y.o. [id number] at 39w0d   Spontaneous labor  A neg / RI / GBS neg  Anemia during pregnancy  Gestational thrombocytopenia  Elevated BP without diagnosis of HTN  Tachycardia    Post-operative Diagnosis:  Francesca Espinal is a 25 y.o. [id number] at 39w0d   Spontaneous labor  A neg / RI / GBS neg  Anemia during pregnancy  Gestational thrombocytopenia  Elevated BP without diagnosis of HTN  Tachycardia    Surgeon: Shiva Jacobs DO    Assistants: See operative record    Anesthesia: Epidural    Intake/Output:    QBL: 704 ml  IVF: 500 ml  UOP: See operative record      Drains: None                Specimens:   1. Placenta to pathology   2. Cord blood  3. Cord segment  4. Cord gasses     Findings:  Fetal Findings:  1. Clear Amniotic fluid  2. Viable female infant with spontaneous cry and normal tone   3. Delivered in the OP position  4. APGARS 2 and 8   5. Infant weight 3380g  5. Intact Placenta with 3 Vessel Cord  6. Cord gasses pH 6.9, BE  -9.4    Maternal Findings:   1. Normal appearing uterus, tubes and ovaries           Implants: None            Complications:  None                   Condition: Mother stable to postpartum recovery room. Infant stable to [facility]. Indications and Consent:   Francesca Espinal is a 25 y.o. Kandi Chávez gp at 39w0d who was admitted for spontaneous onset of labor. Amniotomy was performed. Patient progressed to complete cervical dilation. Patient began pushing with good maternal effort and pushed for approximately 2 hours. Infant was noted to be in the occiput posterior position. With pushing infant became increasingly extended in positioning until the bridge of the nose was palpable at the pubic bone. Risks, benefits and alternatives were reviewed with the patient.   Risks include, but are not limited to, infection, bleeding, injury to the uterus and pelvic structures including the fetus, implications for future pregnancies, risk of anesthesia and even death. All questions were answered to the patient's satisfaction. Patient wishes to proceed with primary low transverse  delivery. Consents are signed and in chart. Procedure Details   The patient was taken to the operating room where epidural from labor was dosed. Patient was placed on the operating table in the dorsal supine position with a leftward tilt. Patient was prepped and draped in the normal sterile fashion including a vaginal prep. A universal time out was performed and all parties agreed to the patient and procedure information. Patient received Ancef and Azithromycin for antibiotic prophylaxis. Anesthesia was tested in 4 locations and patient continued to have sharp pain sensation. Patient was provided additional IV medications for comfort throughout the case. A Pfannenstiel incision was created with a scalpel and carried down to the fascia. The fascia was incised in the midline and extended in a curvilinear fashion with Will scissors. The superior fascial reflection was grasped with Kocher clamps and the underlying rectus muscles were dissected off bluntly and sharply using Will scissors. The inferior fascial reflection was then grasped using Kocher clamps and the underlying rectus muscles were dissected off bluntly and sharply to the level of the pubic bone. The peritoneum was identified and and entered bluntly. The peritoneal incision was extended using manual traction. The bladder blade was placed to keep the bladder out of the operative field. The lower uterine segment was identified and incised in a curvilinear fashion using a scalpel. The uterine incision was extended using manual traction. The surgeons hand was placed in the incision and the infants head was elevated to the level of the incision.   Bladder blade was removed and the infant was delivered atraumatically in the occiput posterior position using gentle fundal pressure. The infant was noted to have poor tone and absent spontaneous cry. The mouth and nose were bulb suctioned. The cord was clamped and cut and the infant was handed off to awaiting nursing staff. Cord segment was taken for cord gasses as noted above. Cord blood collected. The placenta was delivered intact with a 3 vessel cord using fundal massage and gentle cord traction. The uterus was exteriorized and cleared of all clots and debris. The uterine incision was re-approximated using 0 Vicryl suture in a running locked fashion. A second imbricating layer was placed using 0-Vicryl with excellent hemostasis. The posterior cul-de-sac was cleared of all clot and debris. The uterine incision was inspected and noted to be hemostatic. The uterus, tubes and ovaries were returned to the abdomen. Bilateral gutters were cleared of all clot and debris. The uterine incision was inspected with excellent hemostasis noted. The peritoneum was reapproximated in a running fashion using 2-0 Vicryl suture. The undersurface of the fascia and rectus muscles were inspected and excellent hemostasis was noted. The fascia was re-approximated using 1-Vicryl suture in a running fashion. Hemostats and bovie cautery were utilized in the subcutaneous tissue to provide excellent hemostasis of the perforating vessels. The subcutaneous tissue was re-approximated in a running fashion using 2-0 Vicryl suture. The skin was closed in a running fashion using 3-0 Monocryl. Steri strips and a sterile bandage was applied. Using fundal pressure a moderate amount of clots were removed from the lower uterine segment and vagina. All sponge, lap and needle counts were noted to be correct. The patient tolerated the procedure well. She was taken to her postpartum room in stable condition.     Attending Attestation: I performed the procedure.     Tennille Young DO

## 2023-03-12 NOTE — PROGRESS NOTES
Labor Progress Note  [facility]    Subjective: Pt seen and examined. Doing well, no concerns/complaints. Pushing with good maternal effort. Objective:  /60   Pulse (!) 141 Comment: per pulse ox  Temp 98.4 °F (36.9 °C) (Oral)   Resp 18   Ht 5' 5\" (1.651 m)   Wt 154 lb (69.9 kg)   LMP 06/02/2022 (Approximate)   SpO2 100%   BMI 25.63 kg/m²     Cervix:  Dilation (cm): 10  Dilation Complete Date: 03/12/23  Dilation Complete Time: 1100  Effacement: 100  Cervical Characteristics: Soft  Station: +1  Presentation: Vertex  OB Examiner: Daphne Briceño RN    FHTs: 155 baseline, mod malina, +accels, neg decels  [place]: q2-4 min    Infusions:    lactated ringers IV soln 125 mL/hr at 03/12/23 0645    sodium chloride      oxytocin       ? Assessment/Plan:  1. Intrapartum  - FHTs reassuring   - Continue expectant mgmt.  - Suspected ROT position, however pushing well. Will continue to push.     Abdi Hernandez, DO

## 2023-03-12 NOTE — PROGRESS NOTES
Evaluated infant in the special care nursery following delivery. Abrasion noted to the fetal scalp as well as bruising to the forehead and nose. Likely secondary to extended OP positioning during pushing. Discussed with Dr. Ceci Pereira following who reports that the nasal bruising is likely related to deviated septum versus a fracture. Reports that though cord gasses were low infant gasses following were within normal limits.

## 2023-03-12 NOTE — LACTATION NOTE
This note was copied from a baby's chart. Lactation Progress Note      Data:    Initial consult for multip experienced breast feeder on DOS with an infant born at 39.0 weeks gestation. MOB breast fed her first infant for 2 years w/o complication. This infant was taken to [facility] for transition after delivery to be checked out for facial bruising and a possibly broken nose. RN calling for consult once infant was returned to mother's room for assistance with first breast feed. Action:    Introduced self to patient as lactation, name and phone number written on white board in room. Assisted MOB get infant into football position at left breast. Educated MOB about how to support infants head for a GONZALEZ. Infant eagerly latched and fed for 30 minutes. At one point latch became shallow. Demonstrated for MOB how to break suction & get infant re-latched. Reviewed with mother what to expect over the next  24-48 hours with infant feedings, infant output, the importance of a deep latch and how to achieve it, normal  behavior, how to wake a sleepy infant to feed, how the breasts work to make milk, protecting milk supply, what to expect with cluster feeding, and breast care. Reviewed with mother how to hand express colostrum. Reviewed infant feeding cues and encouraged mother to allow infant to breast feed on demand anytime feeding cues are shown and if no feeding cues are shown to attempt to wake infant to feed every 2-3 hours. If infant is still too sleepy to latch to hand express colostrum into infants mouth for about ten minutes, then try again in 2-3 hours. After the first day of life to breast feed a minimum of 8-12 times a day per 24 hour period. Also encouraged mother to avoid giving infant a pacifier, bottle, or pump for at least the first two weeks of life or until breast feeding is well established. Encouraged good hydration, nutrition, and rest, and to keep taking prenatal vitamin while lactating. Encouraged much skin to skin between mother and infant and father and infant. Breast feeding log reviewed, all questions answered. Mother instructed to call lactation for F/U care as needed. Response:    MOB pleased with first feed, verbalized an understanding of education provided and will call for assistance as needed.

## 2023-03-12 NOTE — ANESTHESIA PRE PROCEDURE
Department of Anesthesiology  Preprocedure Note       Name:  Ale To   Age:  22 y.o.  :  2000                                          MRN:  [id number]         Date:  3/12/2023      Surgeon: * No surgeons listed *    Procedure: * No procedures listed *    Medications prior to admission:   Prior to Admission medications    Medication Sig Start Date End Date Taking? Authorizing Provider   ferrous sulfate (FE TABS 325) 325 (65 Fe) MG EC tablet Take 325 mg by mouth 3 times daily (with meals)    Historical Provider, MD   Prenatal MV & Min w/FA-DHA (PRENATAL ADULT GUMMY/DHA/FA PO) Take by mouth    Historical Provider, MD       Current medications:    Current Facility-Administered Medications   Medication Dose Route Frequency Provider Last Rate Last Admin   • lactated ringers IV soln infusion   IntraVENous Continuous Layne Simon  mL/hr at 23 0645 125 mL/hr at 23 0645   • lactated ringers bolus  500 mL IntraVENous PRN Layne Simon DO        Or   • lactated ringers bolus  1,000 mL IntraVENous PRN Layne Simon .9 mL/hr at 23 0616 1,000 mL at 23 0616   • sodium chloride flush 0.9 % injection 5-40 mL  5-40 mL IntraVENous 2 times per day Layne Simon DO       • sodium chloride flush 0.9 % injection 5-40 mL  5-40 mL IntraVENous PRN Layne Simon DO       • 0.9 % sodium chloride infusion  25 mL IntraVENous PRN Layne Simon DO       • methylergonovine (METHERGINE) injection 200 mcg  200 mcg IntraMUSCular PRN Layne Simon DO       • carboprost (HEMABATE) injection 250 mcg  250 mcg IntraMUSCular PRN Layne Simon DO       • miSOPROStol (CYTOTEC) tablet 800 mcg  800 mcg Rectal PRN Layne Simon DO       • tranexamic acid (CYKLOKAPRON) 1,000 mg in sodium chloride 0.9 % 100 mL IVPB  1,000 mg IntraVENous Once PRN Layne Simon DO       • oxytocin (PITOCIN) 30 units in 500 mL infusion  87.3  monica-units/min IntraVENous Continuous PRN Saige Cornelia, DO        And    oxytocin (PITOCIN) 10 unit bolus from the bag  10 Units IntraVENous PRN Saige Cornelia, DO        ondansetron [facility]) injection 4 mg  4 mg IntraVENous Q6H PRN Saige Cornelia, DO        famotidine (PEPCID) injection 20 mg  20 mg IntraVENous BID Saige Cornelia, DO        sodium chloride 0.9 % 200 mL with fentaNYL 500 mcg, bupivacaine 0.5% 50 mL (OB) epidural                Allergies: Allergies   Allergen Reactions    Macrobid [Nitrofurantoin] Rash     Itching and rash       Problem List:    Patient Active Problem List   Diagnosis Code    Pain associated with accessory navicular bone of foot, right M79.671, Q74.2    Missed  O02.1    History of molar pregnancy Z87.59     (normal spontaneous vaginal delivery) O80    Prenatal care in third trimester Z30.80    Rh negative state in antepartum period O26.899, Z67.91    Benign gestational thrombocytopenia in third trimester ([facility] [address] O99.113, D69.6    Normal labor O80, Z37.9       Past Medical History:        Diagnosis Date    Anemia     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 2021    reports seeing specialist and specialist not concerned    Molar pregnancy     G1       Past Surgical History:        Procedure Laterality Date    DILATION AND CURETTAGE OF UTERUS N/A 2020    SUCTION DILATATION AND CURETTAGE performed by Jd Aguirre MD at [facility] History:    Social History     Tobacco Use    Smoking status: Never    Smokeless tobacco: Never   Substance Use Topics    Alcohol use:  No                                Counseling given: Not Answered      Vital Signs (Current):   Vitals:    23 0648 23 0651 23 0654 23 0657   BP: 136/88 (!) 136/91 (!) 137/97 125/81   Pulse: (!) 123  (!) 129 (!) 122   Resp:       Temp:       TempSrc:       SpO2:       Weight:       Height: BP Readings from Last 3 Encounters:   03/12/23 125/81   03/07/23 116/88   02/27/23 124/74       NPO Status:                                                                                 BMI:   Wt Readings from Last 3 Encounters:   03/12/23 154 lb (69.9 kg)   03/07/23 154 lb (69.9 kg)   02/27/23 156 lb (70.8 kg)     Body mass index is 25.63 kg/m². CBC:   Lab Results   Component Value Date/Time    WBC 8.4 03/12/2023 05:45 AM    RBC 4.27 03/12/2023 05:45 AM    HGB 11.6 03/12/2023 05:45 AM    HCT 35.1 03/12/2023 05:45 AM    MCV 82.3 03/12/2023 05:45 AM    RDW 17.0 03/12/2023 05:45 AM     03/12/2023 05:45 AM       CMP:   Lab Results   Component Value Date/Time     05/26/2020 06:40 AM    K 3.9 05/26/2020 06:40 AM     05/26/2020 06:40 AM    CO2 25 05/26/2020 06:40 AM    BUN 8 05/26/2020 06:40 AM    CREATININE <0.5 05/26/2020 06:40 AM    GFRAA >60 05/26/2020 06:40 AM    LABGLOM >60 05/26/2020 06:40 AM    GLUCOSE 84 05/26/2020 06:40 AM    CALCIUM 9.2 05/26/2020 06:40 AM       POC Tests: No results for input(s): POCGLU, POCNA, POCK, POCCL, POCBUN, POCHEMO, POCHCT in the last 72 hours.     Coags: No results found for: PROTIME, INR, APTT    HCG (If Applicable):   Lab Results   Component Value Date    PREGTESTUR Positive 09/04/2020        ABGs: No results found for: PHART, PO2ART, IRC0KNR, XQC7XOY, BEART, F2RIRLRW     Type & Screen (If Applicable):  No results found for: LABABO, LABRH    Drug/Infectious Status (If Applicable):  No results found for: HIV, HEPCAB    COVID-19 Screening (If Applicable):   Lab Results   Component Value Date/Time    COVID19 Not Detected 04/20/2021 08:16 AM    COVID19 Not Detected 05/25/2020 04:26 PM           Anesthesia Evaluation  Patient summary reviewed and Nursing notes reviewed no history of anesthetic complications:   Airway: Mallampati: II  TM distance: >3 FB   Neck ROM: full  Mouth opening: > = 3 FB   Dental: normal exam         Pulmonary:Negative Pulmonary ROS and normal exam                               Cardiovascular:Negative CV ROS  Exercise tolerance: good (>4 METS),            Beta Blocker:  Not on Beta Blocker         Neuro/Psych:   Negative Neuro/Psych ROS              GI/Hepatic/Renal: Neg GI/Hepatic/Renal ROS            Endo/Other:                      ROS comment: Benign gest. Thrombocytopenia    Anemia Abdominal:             Vascular: negative vascular ROS. Other Findings:           Anesthesia Plan      epidural     ASA 2 - emergent     (Patient request epidural for labor and delivery. Discussed procedure and risks including but not limited to changes in VSS, allergic reaction, infection, intravascular injection, paresthesia, n/v, severe headache, temporary or permanent rare neurologic sequelae and failed block. All questions answered. Patient agreed to proceed)        Anesthetic plan and risks discussed with patient. Use of blood products discussed with patient whom consented to blood products.                  Height: 5' 5\" (1.651 m), Weight: 154 lb (69.9 kg), BP: 129/73    Lab Results   Component Value Date    WBC 8.4 03/12/2023    HGB 11.6 (L) 03/12/2023    HCT 35.1 (L) 03/12/2023    MCV 82.3 03/12/2023     (L) 03/12/2023         Consuelo Weinstein, APRN - CRNA   3/12/2023

## 2023-03-13 LAB
ABO/RH: NORMAL
BASOPHILS ABSOLUTE: 0 K/UL (ref 0–0.2)
BASOPHILS RELATIVE PERCENT: 0.3 %
EOSINOPHILS ABSOLUTE: 0 K/UL (ref 0–0.6)
EOSINOPHILS RELATIVE PERCENT: 0.3 %
FETAL SCREEN: NORMAL
HCT VFR BLD CALC: 25.4 % (ref 36–48)
HEMOGLOBIN: 8.3 G/DL (ref 12–16)
LYMPHOCYTES ABSOLUTE: 1.2 K/UL (ref 1–5.1)
LYMPHOCYTES RELATIVE PERCENT: 10.3 %
MCH RBC QN AUTO: 27.1 PG (ref 26–34)
MCHC RBC AUTO-ENTMCNC: 32.7 G/DL (ref 31–36)
MCV RBC AUTO: 82.8 FL (ref 80–100)
MONOCYTES ABSOLUTE: 0.7 K/UL (ref 0–1.3)
MONOCYTES RELATIVE PERCENT: 6 %
NEUTROPHILS ABSOLUTE: 9.6 K/UL (ref 1.7–7.7)
NEUTROPHILS RELATIVE PERCENT: 83.1 %
PDW BLD-RTO: 17.3 % (ref 12.4–15.4)
PLATELET # BLD: 121 K/UL (ref 135–450)
PMV BLD AUTO: 9.1 FL (ref 5–10.5)
RBC # BLD: 3.06 M/UL (ref 4–5.2)
RHIG LOT NUMBER: NORMAL
TOTAL SYPHILLIS IGG/IGM: NORMAL
WBC # BLD: 11.5 K/UL (ref 4–11)

## 2023-03-13 PROCEDURE — 99024 POSTOP FOLLOW-UP VISIT: CPT | Performed by: OBSTETRICS & GYNECOLOGY

## 2023-03-13 PROCEDURE — 96372 THER/PROPH/DIAG INJ SC/IM: CPT

## 2023-03-13 PROCEDURE — 6370000000 HC RX 637 (ALT 250 FOR IP): Performed by: OBSTETRICS & GYNECOLOGY

## 2023-03-13 PROCEDURE — 86901 BLOOD TYPING SEROLOGIC RH(D): CPT

## 2023-03-13 PROCEDURE — 85461 HEMOGLOBIN FETAL: CPT

## 2023-03-13 PROCEDURE — 85025 COMPLETE CBC W/AUTO DIFF WBC: CPT

## 2023-03-13 PROCEDURE — 2500000003 HC RX 250 WO HCPCS: Performed by: OBSTETRICS & GYNECOLOGY

## 2023-03-13 PROCEDURE — 2580000003 HC RX 258: Performed by: OBSTETRICS & GYNECOLOGY

## 2023-03-13 PROCEDURE — 1220000000 HC SEMI PRIVATE OB R&B

## 2023-03-13 PROCEDURE — 6360000002 HC RX W HCPCS: Performed by: OBSTETRICS & GYNECOLOGY

## 2023-03-13 PROCEDURE — 36415 COLL VENOUS BLD VENIPUNCTURE: CPT

## 2023-03-13 PROCEDURE — 86900 BLOOD TYPING SEROLOGIC ABO: CPT

## 2023-03-13 RX ORDER — FERROUS SULFATE 325(65) MG
325 TABLET ORAL 2 TIMES DAILY WITH MEALS
Status: DISCONTINUED | OUTPATIENT
Start: 2023-03-13 | End: 2023-03-14 | Stop reason: HOSPADM

## 2023-03-13 RX ADMIN — IBUPROFEN 600 MG: 600 TABLET, FILM COATED ORAL at 11:30

## 2023-03-13 RX ADMIN — ACETAMINOPHEN 1000 MG: 500 TABLET ORAL at 16:27

## 2023-03-13 RX ADMIN — ACETAMINOPHEN 1000 MG: 500 TABLET ORAL at 00:07

## 2023-03-13 RX ADMIN — ACETAMINOPHEN 1000 MG: 500 TABLET ORAL at 08:27

## 2023-03-13 RX ADMIN — FERROUS SULFATE TAB 325 MG (65 MG ELEMENTAL FE) 325 MG: 325 (65 FE) TAB at 16:27

## 2023-03-13 RX ADMIN — OXYCODONE HYDROCHLORIDE 5 MG: 5 TABLET ORAL at 03:25

## 2023-03-13 RX ADMIN — OXYCODONE HYDROCHLORIDE 10 MG: 5 TABLET ORAL at 13:07

## 2023-03-13 RX ADMIN — HUMAN RHO(D) IMMUNE GLOBULIN 300 MCG: 300 INJECTION, SOLUTION INTRAMUSCULAR at 18:00

## 2023-03-13 RX ADMIN — IBUPROFEN 600 MG: 600 TABLET, FILM COATED ORAL at 19:27

## 2023-03-13 RX ADMIN — DOCUSATE SODIUM 100 MG: 100 CAPSULE, LIQUID FILLED ORAL at 08:27

## 2023-03-13 RX ADMIN — IBUPROFEN 600 MG: 600 TABLET, FILM COATED ORAL at 03:25

## 2023-03-13 RX ADMIN — FERROUS SULFATE TAB 325 MG (65 MG ELEMENTAL FE) 325 MG: 325 (65 FE) TAB at 11:27

## 2023-03-13 RX ADMIN — DOCUSATE SODIUM 100 MG: 100 CAPSULE, LIQUID FILLED ORAL at 21:31

## 2023-03-13 RX ADMIN — Medication 10 ML: at 11:30

## 2023-03-13 RX ADMIN — FAMOTIDINE 20 MG: 10 INJECTION, SOLUTION INTRAVENOUS at 08:28

## 2023-03-13 RX ADMIN — Medication 10 ML: at 21:31

## 2023-03-13 RX ADMIN — OXYCODONE HYDROCHLORIDE 10 MG: 5 TABLET ORAL at 18:55

## 2023-03-13 ASSESSMENT — PAIN SCALES - GENERAL
PAINLEVEL_OUTOF10: 6
PAINLEVEL_OUTOF10: 7
PAINLEVEL_OUTOF10: 5
PAINLEVEL_OUTOF10: 7
PAINLEVEL_OUTOF10: 7
PAINLEVEL_OUTOF10: 8
PAINLEVEL_OUTOF10: 3

## 2023-03-13 ASSESSMENT — PAIN DESCRIPTION - DESCRIPTORS
DESCRIPTORS: ACHING
DESCRIPTORS: PRESSURE;SHARP
DESCRIPTORS: ACHING
DESCRIPTORS: ACHING;SORE

## 2023-03-13 ASSESSMENT — PAIN DESCRIPTION - LOCATION
LOCATION: ABDOMEN
LOCATION: INCISION
LOCATION: ABDOMEN;INCISION
LOCATION: ABDOMEN

## 2023-03-13 ASSESSMENT — PAIN DESCRIPTION - ORIENTATION
ORIENTATION: LOWER
ORIENTATION: LOWER

## 2023-03-13 ASSESSMENT — PAIN - FUNCTIONAL ASSESSMENT
PAIN_FUNCTIONAL_ASSESSMENT: ACTIVITIES ARE NOT PREVENTED

## 2023-03-13 NOTE — PLAN OF CARE
Problem: Postpartum  Goal: Experiences normal postpartum course  Description:  Postpartum OB-Pregnancy care plan goal which identifies if the mother is experiencing a normal postpartum course  3/13/2023 075 by Rosendo Ponce RN  Outcome: Progressing  3/13/2023 0057 by Valdemar Neevs RN  Outcome: Progressing  Goal: Appropriate maternal -  bonding  Description:  Postpartum OB-Pregnancy care plan goal which identifies if the mother and  are bonding appropriately  3/13/2023 075 by Rosendo Ponce RN  Outcome: Progressing  3/13/2023 0057 by Valdemar Neves RN  Outcome: Progressing  Goal: Establishment of infant feeding pattern  Description:  Postpartum OB-Pregnancy care plan goal which identifies if the mother is establishing a feeding pattern with their   3/13/2023 075 by Rosendo Ponce RN  Outcome: Progressing  3/13/2023 0057 by Valdemar Neves RN  Outcome: Progressing  Goal: Incisions, wounds, or drain sites healing without S/S of infection  3/13/2023 075 by Rosendo Ponce RN  Outcome: Progressing  3/13/2023 0057 by Valdemar Neves RN  Outcome: Progressing     Problem: Pain  Goal: Verbalizes/displays adequate comfort level or baseline comfort level  3/13/2023 0751 by Rosendo Ponce RN  Outcome: Progressing  3/13/2023 0057 by Valdemar Neves RN  Outcome: Progressing     Problem: Infection - Adult  Goal: Absence of infection at discharge  3/13/2023 075 by Rosendo Ponce RN  Outcome: Progressing  3/13/2023 0057 by Valdemar Neves RN  Outcome: Progressing  Goal: Absence of infection during hospitalization  3/13/2023 075 by Rosendo Ponce RN  Outcome: Progressing  3/13/2023 0057 by Valdemar Neves RN  Outcome: Progressing  Goal: Absence of fever/infection during anticipated neutropenic period  3/13/2023 075 by Rosendo Ponce RN  Outcome: Progressing  3/13/2023 0057 by Valdemar Neves RN  Outcome: Progressing     Problem: Safety - Adult  Goal: Free from fall injury  3/13/2023 0751 by Luann Richards RN  Outcome: Progressing  3/13/2023 0057 by Nathalia Silva RN  Outcome: Progressing     Problem: Discharge Planning  Goal: Discharge to home or other facility with appropriate resources  3/13/2023 0751 by Luann Richards RN  Outcome: Progressing  3/13/2023 0057 by Nathalia Silva RN  Outcome: Progressing     Problem: Chronic Conditions and Co-morbidities  Goal: Patient's chronic conditions and co-morbidity symptoms are monitored and maintained or improved  3/13/2023 0751 by Luann Richards RN  Outcome: Progressing  3/13/2023 0057 by Nathalia Silva RN  Outcome: Progressing

## 2023-03-13 NOTE — PLAN OF CARE
Problem: Postpartum  Goal: Experiences normal postpartum course  3/13/2023 0057 by Joselin Yates RN  Outcome: Progressing  3/12/2023 1535 by Serafin Valente RN  Outcome: Progressing  Goal: Appropriate maternal -  bonding  3/13/2023 005 by Joselin Yates RN  Outcome: Progressing  3/12/2023 1535 by Serafin Valente RN  Outcome: Progressing  Goal: Establishment of infant feeding pattern  3/13/2023 005 by Joselin Yates RN  Outcome: Progressing  3/12/2023 1535 by Serafin aVlente RN  Outcome: Progressing  Goal: Incisions, wounds, or drain sites healing without S/S of infection  3/13/2023 005 by Joselin Yates RN  Outcome: Progressing  3/12/2023 1535 by Serafin Valente RN  Outcome: Progressing     Problem: Pain  Goal: Verbalizes/displays adequate comfort level or baseline comfort level  3/13/2023 005 by Joselin Yates RN  Outcome: Progressing  3/12/2023 1535 by Serafin Valente RN  Outcome: Progressing     Problem: Infection - Adult  Goal: Absence of infection at discharge  3/13/2023 005 by Joselin Yates RN  Outcome: Progressing  3/12/2023 1535 by Serafin Valente RN  Outcome: Progressing  Goal: Absence of infection during hospitalization  3/13/2023 005 by Joselin Yates RN  Outcome: Progressing  3/12/2023 1535 by Serafin Valente RN  Outcome: Progressing  Goal: Absence of fever/infection during anticipated neutropenic period  3/13/2023 005 by Joselin Yates RN  Outcome: Progressing  3/12/2023 1535 by Serafin Valente RN  Outcome: Progressing     Problem: Safety - Adult  Goal: Free from fall injury  3/13/2023 005 by Joselin Yates RN  Outcome: Progressing  3/12/2023 1535 by Serafin Valente RN  Outcome: Progressing     Problem: Discharge Planning  Goal: Discharge to home or other facility with appropriate resources  3/13/2023 005 by Joselin Yates RN  Outcome: Progressing  3/12/2023 1535 by Serafin Valente RN  Outcome: Progressing     Problem: Chronic Conditions and Co-morbidities  Goal: Patient's chronic conditions and co-morbidity symptoms are monitored and maintained or improved  3/13/2023 0057 by Betito Perez RN  Outcome: Progressing  3/12/2023 1535 by Morley Severin, RN  Outcome: Progressing

## 2023-03-13 NOTE — PLAN OF CARE
Problem: Postpartum  Goal: Experiences normal postpartum course  Description:  Postpartum OB-Pregnancy care plan goal which identifies if the mother is experiencing a normal postpartum course  3/13/2023 1939 by Rekha Jonas RN  Outcome: Progressing  3/13/2023 075 by Jailyn Menezes RN  Outcome: Progressing  Goal: Appropriate maternal -  bonding  Description:  Postpartum OB-Pregnancy care plan goal which identifies if the mother and  are bonding appropriately  3/13/2023 1939 by Rekha Jonas RN  Outcome: Progressing  3/13/2023 075 by Jailyn Menezes RN  Outcome: Progressing  Goal: Establishment of infant feeding pattern  Description:  Postpartum OB-Pregnancy care plan goal which identifies if the mother is establishing a feeding pattern with their   3/13/2023 1939 by Rekha Jonas RN  Outcome: Progressing  3/13/2023 075 by Jailyn Menezes RN  Outcome: Progressing  Goal: Incisions, wounds, or drain sites healing without S/S of infection  3/13/2023 1939 by Rekha Jonas RN  Outcome: Progressing  3/13/2023 075 by Jailyn Menezes RN  Outcome: Progressing     Problem: Pain  Goal: Verbalizes/displays adequate comfort level or baseline comfort level  3/13/2023 1939 by Rekha Jonas RN  Outcome: Progressing  3/13/2023 075 by Jailyn Menezes RN  Outcome: Progressing     Problem: Infection - Adult  Goal: Absence of infection at discharge  3/13/2023 1939 by Rekha Jonas RN  Outcome: Progressing  3/13/2023 075 by Jailyn Menezes RN  Outcome: Progressing  Goal: Absence of infection during hospitalization  3/13/2023 1939 by Rekha Jonas RN  Outcome: Progressing  3/13/2023 0751 by Jailyn Menezes RN  Outcome: Progressing  Goal: Absence of fever/infection during anticipated neutropenic period  3/13/2023 1939 by Rekha Jonas RN  Outcome: Progressing  3/13/2023 075 by Jailyn Menezes RN  Outcome: Progressing     Problem: Safety - Adult  Goal: Free from fall injury  3/13/2023 1939 by Rekha Jonas RN  Outcome: Progressing  3/13/2023 0751 by Brooklyn Gee RN  Outcome: Progressing     Problem: Discharge Planning  Goal: Discharge to home or other facility with appropriate resources  3/13/2023 1939 by Emogene Sicard, RN  Outcome: Progressing  3/13/2023 0751 by Brooklyn Gee RN  Outcome: Progressing     Problem: Chronic Conditions and Co-morbidities  Goal: Patient's chronic conditions and co-morbidity symptoms are monitored and maintained or improved  3/13/2023 1939 by Emogene Sicard, RN  Outcome: Progressing  3/13/2023 0751 by Brooklyn Gee RN  Outcome: Progressing

## 2023-03-13 NOTE — PROGRESS NOTES
Pt assisted by 2 staff members to restroom for first time get up. Pt denies dizziness or lightheadedness. Gait steady. Pericare done by pt with RN instruction. New ice pack, pad and panties put on pt. Gown changed. Hand hygiene done. Complete linen change done and comfort pad put on bed. Pt tolerated well.

## 2023-03-13 NOTE — LACTATION NOTE
This note was copied from a baby's chart. Lactation Progress Note      Data:     F/U on multip breast feeder. Mob states that baby continues to breast feed well and just finished cluster feeding for 3 hours. States that the latch is comfortable but she struggles with comfortable positioning due to c/sec delivery. Action: LC offered assistance with comfortable positioning. Encouraged to call with her next breast feed. Breast feeding education reviewed. [facility] number on board for f/u support. Response: Comfortable with breast feeding.

## 2023-03-13 NOTE — ANESTHESIA POSTPROCEDURE EVALUATION
Department of Anesthesiology  Postprocedure Note    Patient: Zandra Berrios  MRN: [id number]  YOB: 2000  Date of evaluation: 3/13/2023      Procedure Summary     Date: 23 Room / Location: [address] [place] [address] L&D [address]    Anesthesia Start: 2837 Anesthesia Stop:     Procedures:        SECTION (Abdomen)      Labor Analgesia Diagnosis:       Term pregnancy      (failure to descend)    Surgeons: Kendall Monae DO Responsible Provider: Chuck Cohen MD    Anesthesia Type: epidural ASA Status: 2 - Emergent          Anesthesia Type: No value filed. Scott Phase I: Scott Score: 9    Scott Phase II: Scott Score: 9      Anesthesia Post Evaluation    Level of consciousness: awake  Complications: no  Cardiovascular status: hemodynamically stable  Respiratory status: acceptable  Comments: No apparent complications from neuraxial anesthesia.

## 2023-03-14 VITALS
SYSTOLIC BLOOD PRESSURE: 116 MMHG | WEIGHT: 154 LBS | OXYGEN SATURATION: 98 % | DIASTOLIC BLOOD PRESSURE: 77 MMHG | HEIGHT: 65 IN | TEMPERATURE: 97.9 F | RESPIRATION RATE: 16 BRPM | HEART RATE: 99 BPM | BODY MASS INDEX: 25.66 KG/M2

## 2023-03-14 LAB
BASOPHILS # BLD: 0.1 K/UL (ref 0–0.2)
BASOPHILS NFR BLD: 0.6 %
DEPRECATED RDW RBC AUTO: 17 % (ref 12.4–15.4)
EOSINOPHIL # BLD: 0.1 K/UL (ref 0–0.6)
EOSINOPHIL NFR BLD: 1.3 %
HCT VFR BLD AUTO: 25.6 % (ref 36–48)
HGB BLD-MCNC: 8.5 G/DL (ref 12–16)
LYMPHOCYTES # BLD: 1.2 K/UL (ref 1–5.1)
LYMPHOCYTES NFR BLD: 13 %
MCH RBC QN AUTO: 27.6 PG (ref 26–34)
MCHC RBC AUTO-ENTMCNC: 33.1 G/DL (ref 31–36)
MCV RBC AUTO: 83.4 FL (ref 80–100)
MONOCYTES # BLD: 0.6 K/UL (ref 0–1.3)
MONOCYTES NFR BLD: 6.2 %
NEUTROPHILS # BLD: 7.2 K/UL (ref 1.7–7.7)
NEUTROPHILS NFR BLD: 78.9 %
PLATELET # BLD AUTO: 117 K/UL (ref 135–450)
PMV BLD AUTO: 8.7 FL (ref 5–10.5)
RBC # BLD AUTO: 3.07 M/UL (ref 4–5.2)
WBC # BLD AUTO: 9.1 K/UL (ref 4–11)

## 2023-03-14 PROCEDURE — 6370000000 HC RX 637 (ALT 250 FOR IP): Performed by: OBSTETRICS & GYNECOLOGY

## 2023-03-14 PROCEDURE — 99024 POSTOP FOLLOW-UP VISIT: CPT | Performed by: OBSTETRICS & GYNECOLOGY

## 2023-03-14 PROCEDURE — 36415 COLL VENOUS BLD VENIPUNCTURE: CPT

## 2023-03-14 PROCEDURE — 85025 COMPLETE CBC W/AUTO DIFF WBC: CPT

## 2023-03-14 RX ORDER — PSEUDOEPHEDRINE HCL 30 MG
100 TABLET ORAL 2 TIMES DAILY PRN
Qty: 30 CAPSULE | Refills: 1 | Status: SHIPPED | OUTPATIENT
Start: 2023-03-14

## 2023-03-14 RX ORDER — OXYCODONE HYDROCHLORIDE 5 MG/1
5 TABLET ORAL EVERY 6 HOURS PRN
Qty: 18 TABLET | Refills: 0 | Status: SHIPPED | OUTPATIENT
Start: 2023-03-14 | End: 2023-03-17

## 2023-03-14 RX ORDER — FERROUS SULFATE 325(65) MG
325 TABLET ORAL
Qty: 30 TABLET | Refills: 3 | Status: SHIPPED | OUTPATIENT
Start: 2023-03-14

## 2023-03-14 RX ORDER — IBUPROFEN 600 MG/1
600 TABLET ORAL EVERY 8 HOURS SCHEDULED
Qty: 40 TABLET | Refills: 1 | Status: SHIPPED | OUTPATIENT
Start: 2023-03-14

## 2023-03-14 RX ADMIN — OXYCODONE HYDROCHLORIDE 10 MG: 5 TABLET ORAL at 06:33

## 2023-03-14 RX ADMIN — DOCUSATE SODIUM 100 MG: 100 CAPSULE, LIQUID FILLED ORAL at 09:05

## 2023-03-14 RX ADMIN — FERROUS SULFATE TAB 325 MG (65 MG ELEMENTAL FE) 325 MG: 325 (65 FE) TAB at 09:05

## 2023-03-14 RX ADMIN — OXYCODONE HYDROCHLORIDE 10 MG: 5 TABLET ORAL at 13:43

## 2023-03-14 RX ADMIN — IBUPROFEN 600 MG: 600 TABLET, FILM COATED ORAL at 04:06

## 2023-03-14 RX ADMIN — IBUPROFEN 600 MG: 600 TABLET, FILM COATED ORAL at 11:53

## 2023-03-14 RX ADMIN — OXYCODONE HYDROCHLORIDE 10 MG: 5 TABLET ORAL at 01:01

## 2023-03-14 RX ADMIN — SIMETHICONE 80 MG: 80 TABLET, CHEWABLE ORAL at 05:14

## 2023-03-14 RX ADMIN — ACETAMINOPHEN 1000 MG: 500 TABLET ORAL at 00:05

## 2023-03-14 RX ADMIN — ACETAMINOPHEN 1000 MG: 500 TABLET ORAL at 09:05

## 2023-03-14 ASSESSMENT — PAIN DESCRIPTION - DESCRIPTORS
DESCRIPTORS: ACHING;SORE
DESCRIPTORS: SORE;ACHING;OTHER (COMMENT)
DESCRIPTORS: DISCOMFORT

## 2023-03-14 ASSESSMENT — PAIN DESCRIPTION - ORIENTATION: ORIENTATION: LOWER

## 2023-03-14 ASSESSMENT — PAIN DESCRIPTION - LOCATION
LOCATION: ABDOMEN;INCISION
LOCATION: ABDOMEN;INCISION
LOCATION: INCISION

## 2023-03-14 ASSESSMENT — PAIN SCALES - GENERAL
PAINLEVEL_OUTOF10: 4
PAINLEVEL_OUTOF10: 6
PAINLEVEL_OUTOF10: 8
PAINLEVEL_OUTOF10: 7

## 2023-03-14 ASSESSMENT — PAIN - FUNCTIONAL ASSESSMENT
PAIN_FUNCTIONAL_ASSESSMENT: ACTIVITIES ARE NOT PREVENTED
PAIN_FUNCTIONAL_ASSESSMENT: ACTIVITIES ARE NOT PREVENTED

## 2023-03-14 NOTE — DISCHARGE SUMMARY
Department of Obstetrics and Gynecology  Postpartum Discharge Summary      Admit Date: 3/12/2023    Admit Diagnosis: Normal labor [O80, Z37.9]    Discharge Date: 23    Condition at Discharge: good    Discharge Diagnoses: primary C section    Discharge Disposition:  Home    Service: Obstetrics    Postpartum complications: none     Hospital Course: uncomplicated,  presented in active labor, pushed for 2+ hours, noted to have extended OP positioning, underwent primary  delivery. Uncomplicated postpartum course.  Data:  Information for the patient's :  Yboani Bernard [[id number]]      Weight   Information for the patient's :  Yobani Bernard [[id number]]      Apgars   Information for the patient's :  Yobani Bernard [[id number]]       Disposition of Baby:  Home with mother      Current Discharge Medication List        START taking these medications    Details   oxyCODONE (ROXICODONE) 5 MG immediate release tablet Take 1 tablet by mouth every 6 hours as needed for Pain for up to 3 days.  Max Daily Amount: 20 mg  Qty: 18 tablet, Refills: 0    Comments: Reduce doses taken as pain becomes manageable  Associated Diagnoses:  delivery delivered      ibuprofen (ADVIL;MOTRIN) 600 MG tablet Take 1 tablet by mouth every 8 hours  Qty: 40 tablet, Refills: 1      ferrous sulfate (IRON 325) 325 (65 Fe) MG tablet Take 1 tablet by mouth daily (with breakfast)  Qty: 30 tablet, Refills: 3      docusate sodium (COLACE, DULCOLAX) 100 MG CAPS Take 100 mg by mouth 2 times daily as needed for Constipation  Qty: 30 capsule, Refills: 1           CONTINUE these medications which have NOT CHANGED    Details   Prenatal MV & Min w/FA-DHA (PRENATAL ADULT GUMMY/DHA/FA PO) Take by mouth           STOP taking these medications       ferrous sulfate (FE TABS 325) 325 (65 Fe) MG EC tablet Comments:   Reason for Stopping:               Electronically signed by Laurie Faustino Butler MD on 3/14/2023 at 9:12 AM

## 2023-03-14 NOTE — PROGRESS NOTES
Discharge Phone Call    Patient Name: Buddie Cranker     [facility] Care Provider: Isaias Sellers DO Discharge Date: 3/14/2023    Disposition of baby:    Phone Number: [phone] (home)     Attempts to Contact:  Date:    Caller  Date:    Caller  Date:    Caller    Information for the patient's :  Wilfredo Aguilera [[id number]]   Delivery Method: , Low Transverse     1. Now that you are at home is your pain being well controlled? Y/N   If no, instruct to call       provider. 2. Are you breastfeeding? Y/N    Do you need any extra support from our lactation staff? Y/N    If yes, provide number for lactation. 3. Have you made or already had your first appointment with the baby's doctor? Y/N   If no, do      you know when to schedule it? Y/N    4. Have you scheduled your follow-up appointment? Y/N  If no, do you know when to schedule       it? Y/N   If no, they can find it on printed discharge instructions. 5. Did staff discuss safe sleep during your stay? Y/N   6. Did we explain things in a way you could understand? Y/N  7. Were we respectful of your preferences for labor and birth and include you in the plan of       care? Y/N  If no, please explain _______________________________________________  8. Is there anyone in particular you would like to mention who provided care for you? _______      _________________________________________________________________________     9. Were you given a Post-Birth Warning Signs handout? Y/N  Do you have it somewhere      easily accessible? Y/N  If no, please send them a copy and ask them to put it somewhere      easily found. 10. Have you been crying excessively, having anger or mood swings that feel out of control, or       feel like you can't cope with caring for yourself or baby? Y/N   If yes, they may be showing       signs of postpartum depression and should call provider.  There is also a        depression test on page C5 in their discharge booklet they can take. 13. Do you have any other questions or concerns I can address today?  Y/N  ______________      _________________________________________________________________________    Information provided during call :_________________________________________________  ___________________________________________________________________________    Call completed by:____________________________    Date:_________ Time:___________

## 2023-03-14 NOTE — DISCHARGE INSTRUCTIONS
Thank you for the opportunity to care for you and your family. We hope that you are happy with the care we provided during your stay in the [facility]. We want to ensure that you have the help that you need when you leave the hospital. If there is anything that we can assist you with please let us know. Breastfeeding mothers may contact our lactation specialists with any problems or questions. [facility] lactation services phone number is [phone]. Leave a message and your call will be returned. Please refer to the information provided in the postpartum care booklet. The following are warning signs to remember. Call 911 if you have:    Chest pain or pressure  Shortness of breath, even at rest  Thoughts of hurting yourself or others  Seizures    Call your healthcare provider if you have:    Temperature of 100.4 degrees or higher  Stitches that are not healing             --Swelling, bleeding, drainage, foul odor, redness or warmth in/around your                 stitches, staples, or incision (scar)               -- Bad smelling blood or discharge from the vagina  Vaginal bleeding that has increased             --Soaking through one pad in an hour             --You are passing clots larger than the size of a lemon. Red, warm tender area(s) in your breast or calf. Headache that does not get better, even after taking medicine; or headache with vision changes    Remember to notify all healthcare providers from your date of delivery up to one year after birth! CARING FOR YOURSELF      DIET/ACTIVITY    Eat a well balanced diet focusing on food high in fiber and protein. Drink plenty of fluids, especially water. To avoid constipation you may take a mild stool softener as recommended by your doctor or midwife. Gradually increase your activity. Resume an exercise regime only after being advised by your doctor or midwife.   When sitting or lying down, keep your legs elevated to reduce swelling. Avoid lifting anything heavier than your baby or a gallon of milk. Avoid driving for two weeks or while taking narcotics. No sexual intercourse for 6 weeks, or until advised by your OB provider. Nothing in the vagina: intercourse, tampons or douching. Be prepared to discuss family planning at your follow up OB visit. If you feel tired and have a lack of energy, you may continue to take your prenatal vitamins. Nap when your baby naps to catch up on sleep. EMOTIONS    You may feel portillo, sad, teary and overwhelmed. Contact your OB provider if you think you may be showing signs of postpartum depression. If your baby will not stop crying, contact another adult to help or place the baby in its crib on its back and take a break. Never shake your baby! INCISIONAL/AKBAR CARE    Clean your incision in the shower with mild soap. After shower pat the incision area dry and allow it to be open to the air. If used, steri strips should be removed by 2 weeks. If you are discharged with staples in place, call your OB provider's office to schedule removal.  Vaginal bleeding will decrease in amount over the next few weeks. Cleanse your perineum from front to back using the akbar bottle, after toileting, until bleeding stops. You will notice that as your activity increases, your flow may also increase. This is a sign that you need to rest more often. Call your OB provider if you are saturating more than one maxi pad in an hour and rest does not help. BREAST CARE    FOR BREASTFEEDING MOMS:    If you become engorged, feeding may be more difficult or painful for 1 to 2 days. You may find it helpful to hand express some milk so that the infant can latch more easily. While breastfeeding continue to take your prenatal vitamins as directed. Refer to the breastfeeding information in the discharge binder.     FOR NON-BREASTFEEDING MOMS:    You may apply ice packs to your breasts, over your bra, for 20 minutes at a time for comfort. Do not express breast milk. Avoid stimulation to your breasts. When showering, allow the water to strike only your back. Wear a good fitting bra, such as a sports bra, until your milk dries up. OTHER REASONS TO CALL YOUR DOCTOR    Your abdomen is tender to touch or you have pain that cannot be relieved. Flu-like symptoms such as achy muscles and joints or you are experiencing extreme weakness or dizziness. Persistent burning or increased urgency in urination. LITERATURE PROVIDED    For Moms and Those who Care about Them. Your 's Healthy Start, [address]. Breastfeeding Best for [employer], Best for Mom. [address] Information about Universal Hearing Screening. Controlling Pain. I have received the educational material listed above,  [employer] has provided me with the opportunity to view instructional videos pertaining to infant care and the care of myself. I verify that I have received the above information and have no further questions and feel confident to care for myself and my baby. For more information about postpartum care or baby care and feeding, create a [facility] My Chart account, sign in and search using the magnifying glass and type in postpartum, breastfeeding or formula feeding. [email or web address]

## 2023-03-14 NOTE — LACTATION NOTE
This note was copied from a baby's chart. Lactation Progress Note      Data:     F/u during lactation rounds with multip experienced breast feeder, 1 po. Baby is already latched on the left breast on consult, and appears to be nursing well with SRS and AS heard. Mother confirms that the latch is comfortable with this feeding and feels infant continues breast feeding well since birth. Action: Introduced self as [facility] on for this evening and offered support. Reviewed importance of GONZALEZ with feedings, and educated on how a good latch should look and feel vs a shallow latch. Educated on how to break the suction of the latch as needed if ever experiencing discomfort and attempt to relatch more deeply. Reviewed what to expect with breast feeding over the next 24-48 hours including breast care, how milk production works and types of milk mother will produce, educated on signs of hunger, satiety, and expected  feeding behaviors, as well as reassuring signs that baby is getting enough at the breast including daily goals for infant feedings, output, and weight trends. Encouraged to offer the breast when infant first begins to wake and show early hunger cues, and every 2-3 hours if baby is sleepy and without feeding cues. Gave tips to wake sleepy baby as needed, and encouraged much hand expression and STS contact with attempts to offer the breast. Instructed that baby should have a minimum of 8-12 good feedings in a 24 hour period after the first DOL. Name and number provided on whiteboard and educated on LC's hours for this evening. Encouraged to call for f/u support prn. Response: Verbalized understanding of teaching provided and confident with breast feeding at this time. Will call for f/u support prn.

## 2023-03-14 NOTE — PROGRESS NOTES
Department of Obstetrics and Gynecology  Labor and Delivery  Attending Post Partum Progress Note      SUBJECTIVE:  24 y/o  female s/p primary low transverse  POD#1. The pregnancy was complicated by elevated blood pressure without diagnosis of HTN,  persistent tachycardia (Cardiology consult planned postpartum), Rh negative status, anemia, and gestational thrombocytopenia . No current complaints are noted including headache, change in vision, fever, chills, chest pain, shortness of breath, nausea, vomiting, diarrhea or constipation. The patient denies calf tenderness. Dwyane Fury is described as minimal. The patient plans to breastfeed. Objective:  /73   Pulse 94   Temp 98.5 °F (36.9 °C) (Oral)   Resp 16   Ht 5' 5\" (1.651 m)   Wt 154 lb (69.9 kg)   LMP 2022 (Approximate)   SpO2 98%   Breastfeeding Unknown   BMI 25.63 kg/m²   Vitals:    23 0640 23 0644 23 1107 23 1520   BP:  (!) 99/52 (!) 109/53 107/73   Pulse:  90 93 94   Resp:  16 16 16   Temp:  98.3 °F (36.8 °C) 98.5 °F (36.9 °C) 98.5 °F (36.9 °C)   TempSrc:  Oral Oral Oral   SpO2: 98%      Weight:       Height:           Is and Os  I's 2828. 2 cc  O's 3646 cc        GENERAL APPEARANCE: alert, well appearing, in no apparent distress, oriented to person, place and time, well hydrated  LUNGS: clear to auscultation, no wheezes, rales or rhonchi, symmetric air entry  HEART: regular rate and rhythm  ABDOMEN POSTPARTUM: soft, NT, ND, dressing clean dry intact  EXTREMITIES: no redness or tenderness in the calves or thighs, no edema  SKIN: normal coloration and turgor, no rashes, normal coloration and turgor, no rashes, no suspicious skin lesions noted  NEUROLOGIC: alert, oriented, normal speech, no focal findings or movement disorder noted    DATA:    CBC with Auto Differential  Order: [id number]  Status: Final result    Visible to patient: No (not released)    Next appt: None    0 Result Notes Component Ref Range & Units 3/13/23 0621 3/12/23 0545 1/23/23 1642 12/28/22 1610 9/9/22 1122 4/22/21 0723 4/20/21 0740   WBC 4.0 - 11.0 K/uL 11.5 High   8.4  8.5  6.9  6.4  15.9 High   9.6    RBC 4.00 - 5.20 M/uL 3.06 Low   4.27  3.78 Low   3.57 Low   4.35  3.86 Low   4.27    Hemoglobin 12.0 - 16.0 g/dL 8.3 Low   11.6 Low   10.4 Low   10.0 Low   12.9  11.3 Low   12.4    Hematocrit 36.0 - 48.0 % 25.4 Low   35.1 Low   33.3 Low   30.8 Low   38.2  34.4 Low   37.0    MCV 80.0 - 100.0 fL 82.8  82.3  88.2  86.4  87.9  89.2  86.7    MCH 26.0 - 34.0 pg 27.1  27.3  27.4  28.1  29.7  29.3  29.1    MCHC 31.0 - 36.0 g/dL 32.7  33.2  31.1  32.5  33.8  32.9  33.6    RDW 12.4 - 15.4 % 17.3 High   17.0 High   16.3 High  CM  14.2  14.2  15.7 High   15.2    Platelets 376 - 939 K/uL 121 Low   122 Low   149  146  158  123 Low   143    MPV 5.0 - 10.5 fL 9.1  8.9  9.2  10.1  9.9  9.2  9.7    Neutrophils % % 83.1  75.4  80.0  92.0  80.1   80.3    Lymphocytes % % 10.3  17.6  13.4  1.0  14.6   12.5    Monocytes % % 6.0  6.0  5.5  6.0  4.2   6.3    Eosinophils % % 0.3  0.5  0.7  0.0  0.6   0.5    Basophils % % 0.3  0.5  0.4  0.0  0.5   0.4    Neutrophils Absolute 1.7 - 7.7 K/uL 9.6 High   6.4  6.8  6.4  5.2   7.7    Lymphocytes Absolute 1.0 - 5.1 K/uL 1.2  1.5  1.1  0.1 Low   0.9 Low    1.2    Monocytes Absolute 0.0 - 1.3 K/uL 0.7  0.5  0.5  0.4  0.3   0.6    Eosinophils Absolute 0.0 - 0.6 K/uL 0.0  0.0  0.1  0.0  0.0   0.0    Basophils Absolute 0.0 - 0.2 K/uL 0.0  0.0  0.0  0.0  0.0   0.0    SLIDE REVIEW     see below CM       Myelocyte Percent     1 Abnormal  R       Poikilocytes     Occasional Abnormal        Ovalocytes     Occasional Abnormal        Tear Drop Cells     Occasional Abnormal        Resulting Agency  [facility] [facility] Located within [facility]              Specimen Collected: 03/13/23 06:21 EDT Last Resulted: 23 06:49 EDT        Lab Flowsheet     Order Details     View Encounter     Lab and Collection Details     Routing     Result History     View Encounter Conversation        CM=Additional comments  R=Reference range differs from displayed range        Result Care Coordination      Patient Communication     Not Released  Not seen Back to Top           Testing Performed By:    [address] [address]   Tel: [phone]   : Luis Enrique Reddy M.D.              ASSESSMENT & PLAN:    Impression:  S/P [id number] low transverse POD #1  Elevated blood pressure without the diagnosis of HTN  Persistent tachycardia (Cardiology consult planned postpartum)  Rh negative status  Anemia in pregnancy, acute blood loss anemia  Gestational thrombocytopenia    Plan:   See orders and Patient Instructions   Repeat CBC in AM   Ferrous sulfate   Postpartum supportive care    Asiya Lott, [address] [place] [address],[address] OB/GYN

## 2023-03-14 NOTE — LACTATION NOTE
This note was copied from a baby's chart. Lactation Progress Note      Data:    F/U on multip and experienced breast feeding who is hoping to be d/c home today. Mob states that baby continues to latch well and wants to feed a lot. Output and weight loss are WNL. Currently putting baby to breast.    Action: LC offered a few tips for improved positioning to assure maximum milk transfer. Observed GONZALEZ with SRS and AS. Discharge teaching done; what to expect in the first few days of life, to feed baby at first sign of hunger cue for total of 8-12 times per day after the first DOL, how to properly position and latch baby, how to know baby is getting enough, engorgement prevention and treatment, avoiding bottles and pacifiers and community resources. Encouraged to call [de-identified] or Outpatient [facility] clinic for f/u prn. Response: Verbalized and demonstrated understanding. Comfortable with breast feeding at this time.

## 2023-03-14 NOTE — PROGRESS NOTES
Post Partum Progress Note    Subjective:  Faisal Dickey is a 25 y.o. C4N5030 status-post  uncomplicated . The pregnancy was complicated by  elevated blood pressure without diagnosis of HTN,  persistent tachycardia (Cardiology consult planned postpartum), Rh negative status, anemia, and gestational thrombocytopenia  . Patient is doing well with no concerns. Pain is well controlled with current regimen. Lochia moderate. Tolerating regular diet without difficulty. Ambulating without difficulty, denies dizziness on standing. Voiding without difficulty. She is breast feeding. Denies chest pain, SOB, or leg pain. Objective:  /61   Pulse (!) 108   Temp 98.7 °F (37.1 °C) (Oral)   Resp 18   Ht 5' 5\" (1.651 m)   Wt 154 lb (69.9 kg)   LMP 2022 (Approximate)   SpO2 98%   Breastfeeding Unknown   BMI 25.63 kg/m²     GENERAL APPEARANCE: alert, well appearing, in no apparent distress  LUNGS: clear to auscultation, no wheezes, rales or rhonchi, symmetric air entry  HEART: regular rate and rhythm  ABDOMEN POSTPARTUM: benign non-tender, without masses or organomegaly palpable  EXTREMITIES: no redness or tenderness in the calves or thighs, no edema    I/O last 3 completed shifts: In: 1445.8 [I.V.:1445.8]  Out: 2500 [Urine:2500]    Pertinent Labs:   CBC:   Recent Labs     23  0545 23  0621 23  0734   WBC 8.4 11.5* 9.1   HGB 11.6* 8.3* 8.5*   HCT 35.1* 25.4* 25.6*   * 121* 117*     BMP:    Recent Labs     23  0550      K 3.6      CO2 20*   BUN 8   CREATININE <0.5*   GLUCOSE 101*     Hepatic:   Recent Labs     23  0550   AST 24   ALT 14   BILITOT 0.5   ALKPHOS 149*     Mag:    No results for input(s): MG in the last 72 hours. Phos:   No results for input(s): PHOS in the last 72 hours. INR: No results for input(s): INR in the last 72 hours. Assessment/Plan:  Faisal Dickey is a 25 y.o. [id number] status-post  uncomplicated .      1. POD #2: doing well, routine care  - acute blood loss anemia -- will d/c with iron and colace    2. Infant: F, 2/8, 3380g    3. Labs: A-/ab+ (rhogam), RI, GBS neg  - baby O+, received rhogam 3/13/23    4. Elevated BP without dx of HTN   - normotensive over last 24 hours, asymptomatic    5.  Gestational thrombocytopenia  - no bleeding concerns, platelets stable    Dispo: d/c home today    Jesusita Cox MD  [facility]

## 2023-03-22 ENCOUNTER — POSTPARTUM VISIT (OUTPATIENT)
Dept: OBGYN CLINIC | Age: 23
End: 2023-03-22

## 2023-03-22 VITALS
WEIGHT: 140 LBS | HEART RATE: 97 BPM | DIASTOLIC BLOOD PRESSURE: 66 MMHG | TEMPERATURE: 98.2 F | BODY MASS INDEX: 23.3 KG/M2 | SYSTOLIC BLOOD PRESSURE: 98 MMHG

## 2023-03-22 DIAGNOSIS — R00.0 TACHYCARDIA: ICD-10-CM

## 2023-03-22 PROBLEM — D69.6 BENIGN GESTATIONAL THROMBOCYTOPENIA IN THIRD TRIMESTER (HCC): Status: RESOLVED | Noted: 2023-01-12 | Resolved: 2023-03-22

## 2023-03-22 PROBLEM — Z67.91 RH NEGATIVE STATE IN ANTEPARTUM PERIOD: Status: RESOLVED | Noted: 2022-09-09 | Resolved: 2023-03-22

## 2023-03-22 PROBLEM — O99.113 BENIGN GESTATIONAL THROMBOCYTOPENIA IN THIRD TRIMESTER (HCC): Status: RESOLVED | Noted: 2023-01-12 | Resolved: 2023-03-22

## 2023-03-22 PROBLEM — Z34.93 PRENATAL CARE IN THIRD TRIMESTER: Status: RESOLVED | Noted: 2022-09-09 | Resolved: 2023-03-22

## 2023-03-22 PROBLEM — Z37.9 NORMAL LABOR: Status: RESOLVED | Noted: 2023-03-12 | Resolved: 2023-03-22

## 2023-03-22 PROBLEM — O26.899 RH NEGATIVE STATE IN ANTEPARTUM PERIOD: Status: RESOLVED | Noted: 2022-09-09 | Resolved: 2023-03-22

## 2023-03-22 PROCEDURE — 0503F POSTPARTUM CARE VISIT: CPT | Performed by: OBSTETRICS & GYNECOLOGY

## 2023-03-22 NOTE — PROGRESS NOTES
Postpartum Visit    Subjective:  25 y.o. [id number] female S/P uncomplicated  on 3/12/23 here for postpartum visit. The pregnancy was complicated by  persistent tachycardia (planning for Cardiology consult postpartum), Rh negative status, anemia and gestational thrombocytopenia . Postpartum course has been uncomplicated. Pain is well controlled now, using only motrin/tylenol. Bleeding is minimal. Bowel function is normal. Bladder function is normal. Patient is not sexually active. Contraception method is abstinence. Baby has been doing well without problems. Baby is feeding breast. No other complaints are noted including headache, change in vision, fever, chills, chest pain, shortness of breath, nausea, vomiting, diarrhea or constipation. The patient denies any urinary complaints or calf tenderness. Review of Systems - The following ROS was otherwise negative, except as noted in the HPI: constitutional, respiratory, cardiovascular, gastrointestinal, genitourinary    Objective:  Vitals:    23 1118   BP: 98/66   Pulse: 97   Temp: 98.2 °F (36.8 °C)       GENERAL APPEARANCE: alert, well appearing, in no apparent distress  LUNGS: clear to auscultation, no wheezes, rales or rhonchi, symmetric air entry  HEART: regular rate and rhythm  ABDOMEN POSTPARTUM: incision well-healed, without erythema, without hematoma, and without evidence of infection  EXTREMITIES: no redness or tenderness in the calves or thighs, no edema    MWQ: 0, no SI/HI, no PPD    Impression:  25 y.o. S2N2802 s/p  Section on 3/12/23 here for her postpartum visit:    Plan:  1.  delivery delivered  Doing well, routine care  - Feeding: breast, going well  - BCM: plan POP at next visit  - Moods: no signs/sx PPD, denies SI/HI  - Bleeding: improving    2. Tachycardia  Patient with tachycardia during pregnancy and significant tachy during labor/delivery.  Reports family history of ?arrhythmyias, will refer to cardiology for any

## 2023-04-20 ENCOUNTER — POSTPARTUM VISIT (OUTPATIENT)
Dept: OBGYN CLINIC | Age: 23
End: 2023-04-20

## 2023-04-20 VITALS
HEART RATE: 67 BPM | WEIGHT: 137 LBS | SYSTOLIC BLOOD PRESSURE: 118 MMHG | BODY MASS INDEX: 22.8 KG/M2 | DIASTOLIC BLOOD PRESSURE: 78 MMHG

## 2023-04-20 DIAGNOSIS — R00.0 TACHYCARDIA: ICD-10-CM

## 2023-04-20 DIAGNOSIS — N89.8 VAGINAL DISCHARGE: ICD-10-CM

## 2023-04-20 PROCEDURE — 0503F POSTPARTUM CARE VISIT: CPT | Performed by: OBSTETRICS & GYNECOLOGY

## 2023-04-20 RX ORDER — ACETAMINOPHEN AND CODEINE PHOSPHATE 120; 12 MG/5ML; MG/5ML
1 SOLUTION ORAL DAILY
Qty: 28 TABLET | Refills: 4 | Status: SHIPPED | OUTPATIENT
Start: 2023-04-20

## 2023-04-20 NOTE — PROGRESS NOTES
Postpartum Visit    Subjective:  25 y.o. [id number] female S/P uncomplicated  on 3/12/23 here for postpartum visit. The pregnancy was complicated by  persistent tachycardia (planning for Cardiology consult postpartum), Rh negative status, anemia and gestational thrombocytopenia  . Postpartum course has been uncomplicated. Has had some discharge with itching. Bleeding has resolved. Bowel function is normal. Bladder function is normal. Patient is not sexually active. Contraception method is abstinence. Baby has been doing well without problems. Baby is feeding breast. No other complaints are noted including headache, change in vision, fever, chills, chest pain, shortness of breath, nausea, vomiting, diarrhea or constipation. The patient denies any urinary complaints or calf tenderness. Review of Systems - The following ROS was otherwise negative, except as noted in the HPI: constitutional, respiratory, cardiovascular, gastrointestinal, genitourinary    Objective:  GENERAL APPEARANCE: alert, well appearing, in no apparent distress  LUNGS: clear to auscultation, no wheezes, rales or rhonchi, symmetric air entry  HEART: regular rate and rhythm  ABDOMEN POSTPARTUM: incision well-healed, without erythema, without hematoma, without evidence of infection, and small suture protruding from incision, removed today without difficulty  EXTREMITIES: no redness or tenderness in the calves or thighs, no edema    MWQ: 0    Impression:  25 y.o. [id number] s/p  Section on 3/12/23 here for her postpartum visit:    Plan:  1.  delivery delivered  Doing well, routine care  - Feeding: breast, going well  - BCM: micronor sent to pharmacy  - Moods: no signs/sx PPD, denies SI/HI  - Bleeding: resolved    2. Tachycardia  Has referral for cardiology, will call to schedule. 3. Vaginal discharge  Vaginal pathogens swab sent today, treat pending results.       Dispo: PRN or for annual exam  Jong Killian MD

## 2023-04-21 LAB
CANDIDA DNA VAG QL NAA+PROBE: NORMAL
G VAGINALIS DNA SPEC QL NAA+PROBE: NORMAL
T VAGINALIS DNA VAG QL NAA+PROBE: NORMAL

## 2023-08-07 ENCOUNTER — TELEPHONE (OUTPATIENT)
Dept: CARDIOLOGY CLINIC | Age: 23
End: 2023-08-07

## 2023-08-07 ENCOUNTER — OFFICE VISIT (OUTPATIENT)
Dept: CARDIOLOGY CLINIC | Age: 23
End: 2023-08-07
Payer: COMMERCIAL

## 2023-08-07 VITALS
DIASTOLIC BLOOD PRESSURE: 74 MMHG | HEART RATE: 72 BPM | OXYGEN SATURATION: 97 % | SYSTOLIC BLOOD PRESSURE: 106 MMHG | BODY MASS INDEX: 22.49 KG/M2 | WEIGHT: 135 LBS | HEIGHT: 65 IN

## 2023-08-07 DIAGNOSIS — R07.9 CHEST PAIN, UNSPECIFIED TYPE: ICD-10-CM

## 2023-08-07 DIAGNOSIS — R00.2 PALPITATION: ICD-10-CM

## 2023-08-07 DIAGNOSIS — Z76.89 ESTABLISHING CARE WITH NEW DOCTOR, ENCOUNTER FOR: Primary | ICD-10-CM

## 2023-08-07 DIAGNOSIS — R06.02 SOB (SHORTNESS OF BREATH): ICD-10-CM

## 2023-08-07 PROCEDURE — 99214 OFFICE O/P EST MOD 30 MIN: CPT | Performed by: INTERNAL MEDICINE

## 2023-08-07 PROCEDURE — G8420 CALC BMI NORM PARAMETERS: HCPCS | Performed by: INTERNAL MEDICINE

## 2023-08-07 PROCEDURE — G8427 DOCREV CUR MEDS BY ELIG CLIN: HCPCS | Performed by: INTERNAL MEDICINE

## 2023-08-07 PROCEDURE — 93000 ELECTROCARDIOGRAM COMPLETE: CPT | Performed by: INTERNAL MEDICINE

## 2023-08-07 NOTE — PATIENT INSTRUCTIONS
Plan:  ~Cardiac event monitor for 2 weeks- this will be mailed to you   ~Stress echocardiogram   ~Discussed medication therapy such as Beta blockers to treat palpitations- she would like to hold of for now since her symptoms have decreased   Cardiac medications reviewed including indications and pertinent side effects. Medication list updated at this visit. Check blood pressure and heart rate at home a few times per week- keep a log with dates and times and bring to office visit   Regular exercise and following a healthy diet encouraged   Follow up with me in 6-8 weeks       Your provider has ordered testing for further evaluation. An order/prescription has been included in your paper work. To schedule outpatient testing, contact Central Scheduling by calling [address] [place] [address] ([phone]).

## 2023-08-07 NOTE — PROGRESS NOTES
[facility]   Cardiac Consultation    Referring Provider:  No primary care provider on file. Chief Complaint   Patient presents with    New Patient    Chest Pain     At times    Palpitations     Heart rate goes up with CP, was really bad when pregnant with last child. Dizziness     Dizziness with high heart rate    Shortness of Breath     At times. Ale To   2001      History of Present Illness:      Gale Dance is a 26 y.o. female who is here today as a new patient at the request of her Ob/Gyn for palpitations, chest pain, and shortness of breath. Today she states she has a 3year old and a 11 month old that keep her busy. She states she has always had a higher heart rate, worse with pregnant and in labor. She states her heart rate was jumping up every time she stood while pregnant. She has noticed a high heart rate being higher since high school. She states fast heart rate comes on 2-3 times per week. She felt her heart rate being high 4 times a day when pregnant. She felt like she was going to pass out several times while pregnant. Decreasing her caffeine, eating healthy and staying well hydrated has helped decrease her symptoms. She has chest pain that feels like a tightness when her heart rate is higher. She has a separate issue of palpitations that feels like her heart is skipping beats. She does not drink alcohol or smoke. She has a family HX of heart diease in her father. Patient currently denies any weight gain, edema, dizziness, and syncope. Past Medical History:   has a past medical history of Anemia, 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, and Molar pregnancy. Surgical History:   has a past surgical history that includes Dilation and curettage of uterus (N/A, 2020) and  section (N/A, 3/12/2023). Social History:   reports that she has never smoked.  She has never used smokeless tobacco. She reports that she does not drink

## 2023-08-14 PROCEDURE — 93270 REMOTE 30 DAY ECG REV/REPORT: CPT | Performed by: INTERNAL MEDICINE

## 2023-08-31 PROCEDURE — 93272 ECG/REVIEW INTERPRET ONLY: CPT | Performed by: INTERNAL MEDICINE

## 2023-09-14 DIAGNOSIS — R00.2 PALPITATIONS: Primary | ICD-10-CM

## 2023-09-15 ENCOUNTER — TELEPHONE (OUTPATIENT)
Dept: CARDIOLOGY CLINIC | Age: 23
End: 2023-09-15

## 2023-09-15 NOTE — TELEPHONE ENCOUNTER
----- Message from Irais Cummings MD sent at 9/15/2023  8:49 AM EDT -----  Please notify patient that their monitor shows occ extar heart beats and vet brief fast heart rhythms  No sig arrhythmias  Can sched OV if wants to discuss in detail - next avail

## 2023-09-15 NOTE — TELEPHONE ENCOUNTER
Called patient. Recording states she is not available.  When she calls back let her know-     Please notify patient that their monitor shows occ extar heart beats and vet brief fast heart rhythms  No sig arrhythmias  Can sched OV if wants to discuss in detail - next avail

## 2023-09-19 NOTE — TELEPHONE ENCOUNTER
Third attempt to contact pt, The wireless customer is not available. Will try to contact pt at another time.

## 2023-10-13 ENCOUNTER — OFFICE VISIT (OUTPATIENT)
Dept: ENT CLINIC | Age: 23
End: 2023-10-13
Payer: COMMERCIAL

## 2023-10-13 VITALS
BODY MASS INDEX: 21.99 KG/M2 | SYSTOLIC BLOOD PRESSURE: 122 MMHG | RESPIRATION RATE: 16 BRPM | HEIGHT: 65 IN | DIASTOLIC BLOOD PRESSURE: 82 MMHG | HEART RATE: 90 BPM | TEMPERATURE: 97.9 F | WEIGHT: 132 LBS

## 2023-10-13 DIAGNOSIS — R59.0 CERVICAL LYMPHADENOPATHY: ICD-10-CM

## 2023-10-13 DIAGNOSIS — M54.2 NECK PAIN: Primary | ICD-10-CM

## 2023-10-13 PROCEDURE — G8484 FLU IMMUNIZE NO ADMIN: HCPCS | Performed by: OTOLARYNGOLOGY

## 2023-10-13 PROCEDURE — 99203 OFFICE O/P NEW LOW 30 MIN: CPT | Performed by: OTOLARYNGOLOGY

## 2023-10-13 PROCEDURE — 1036F TOBACCO NON-USER: CPT | Performed by: OTOLARYNGOLOGY

## 2023-10-13 PROCEDURE — G8420 CALC BMI NORM PARAMETERS: HCPCS | Performed by: OTOLARYNGOLOGY

## 2023-10-13 PROCEDURE — G8427 DOCREV CUR MEDS BY ELIG CLIN: HCPCS | Performed by: OTOLARYNGOLOGY

## 2023-10-13 ASSESSMENT — ENCOUNTER SYMPTOMS
SINUS PRESSURE: 0
COUGH: 0
APNEA: 0
FACIAL SWELLING: 0
TROUBLE SWALLOWING: 0
SHORTNESS OF BREATH: 0
EYE ITCHING: 0
SORE THROAT: 0
VOICE CHANGE: 0

## 2025-07-11 ENCOUNTER — OFFICE VISIT (OUTPATIENT)
Dept: OBGYN CLINIC | Age: 25
End: 2025-07-11

## 2025-07-11 VITALS
BODY MASS INDEX: 21.66 KG/M2 | HEART RATE: 88 BPM | DIASTOLIC BLOOD PRESSURE: 83 MMHG | HEIGHT: 65 IN | SYSTOLIC BLOOD PRESSURE: 124 MMHG | OXYGEN SATURATION: 98 % | WEIGHT: 130 LBS

## 2025-07-11 DIAGNOSIS — Z01.419 ENCOUNTER FOR ANNUAL ROUTINE GYNECOLOGICAL EXAMINATION: Primary | ICD-10-CM

## 2025-07-11 DIAGNOSIS — Z12.4 PAP SMEAR FOR CERVICAL CANCER SCREENING: ICD-10-CM

## 2025-07-11 ASSESSMENT — ENCOUNTER SYMPTOMS
NAUSEA: 0
VOMITING: 0
CONSTIPATION: 0
ABDOMINAL PAIN: 0
SHORTNESS OF BREATH: 0
DIARRHEA: 0

## 2025-07-11 NOTE — PROGRESS NOTES
Annual Exam      CC:   Chief Complaint   Patient presents with    Annual Exam       HPI:  24 y.o.  presents for her gynecologic annual exam.    Patient seen and examined.     Review of Systems:   Review of Systems   Respiratory:  Negative for shortness of breath.    Cardiovascular:  Negative for chest pain.   Gastrointestinal:  Negative for abdominal pain, constipation, diarrhea, nausea and vomiting.   Genitourinary:  Negative for difficulty urinating, dysuria, menstrual problem, vaginal bleeding and vaginal discharge.   Neurological:  Negative for headaches.       Primary Care Physician: No primary care provider on file.    Obstetric History  OB History    Para Term  AB Living   3 2 2  1 2   SAB IAB Ectopic Molar Multiple Live Births      1 0 2      # Outcome Date GA Lbr Lio/2nd Weight Sex Type Anes PTL Lv   3 Term 24 39w0d 05:30 / 03:31 3.38 kg (7 lb 7.2 oz) F CS-LTranv EPI N ANGELIKA   2 Term 22 39w1d 09:44 / 02:56 2.98 kg (6 lb 9.1 oz) F Vag-Spont EPI N ANGELIKA      Birth Comments: franco   1 Molar 21               Gynecologic History  Menstrual History:  LMP: 26  Menstrual pattern: q28-30d, 3-4d, light flow  Sexual History:  Contraception: none  Currently is sexually active  Denies history of STIs  No sexual problems  Pap History:  Last pap smear: 2022 NILM  History of abnormal pap smears: denies    Medical History:  Past Medical History:   Diagnosis Date    Anemia     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 2021    reports seeing specialist and specialist not concerned    Molar pregnancy     G1       Surgical History:  Past Surgical History:   Procedure Laterality Date     SECTION N/A 3/12/2023     SECTION performed by Layne Simon DO at [facility] L&D OR    DILATION AND CURETTAGE OF UTERUS N/A 2020    SUCTION DILATATION AND CURETTAGE performed by Cecy Valerio MD at [facility] OR       Medications:  No current outpatient medications on

## (undated) DEVICE — SUTURE ABSORBABLE BRAIDED 2-0 CT-1 27 IN UD VICRYL J259H

## (undated) DEVICE — Z DISCONTINUED NO SUB IDED CURETTE VAC CVD 7MM

## (undated) DEVICE — GARMENT COMPR L FOR 23IN CALF FLOTRN

## (undated) DEVICE — GLOVE SURG SZ 65 THK91MIL LTX FREE SYN POLYISOPRENE

## (undated) DEVICE — D&C: Brand: MEDLINE INDUSTRIES, INC.

## (undated) DEVICE — Device

## (undated) DEVICE — SOLUTION IV IRRIG POUR BRL 0.9% SODIUM CHL 2F7124

## (undated) DEVICE — 3M™ STERI-STRIP™ COMPOUND BENZOIN TINCTURE 40 BAGS/CARTON 4 CARTONS/CASE C1544: Brand: 3M™ STERI-STRIP™

## (undated) DEVICE — BLADE CLIPPER GEN PURP NS

## (undated) DEVICE — SUTURE VCRL SZ 1 L36IN ABSRB VLT L36MM CT-1 1/2 CIR J347H

## (undated) DEVICE — GLOVE SURG SZ 6 THK91MIL LTX FREE SYN POLYISOPRENE ANTI

## (undated) DEVICE — PAD,NON-ADHERENT,3X8,STERILE,LF,1/PK: Brand: MEDLINE

## (undated) DEVICE — S/USE RESUS KIT W/O MASK (10): Brand: FISHER & PAYKEL HEALTHCARE

## (undated) DEVICE — SUTURE VCRL SZ 3-0 L36IN ABSRB UD L36MM CT-1 1/2 CIR J944H

## (undated) DEVICE — CHLORAPREP 26ML ORANGE

## (undated) DEVICE — DRESSING COMP IS W4XL10IN PD W2XL8IN CNTCT LAYR ADH

## (undated) DEVICE — SUTURE MCRYL SZ 3-0 L27IN ABSRB UD L60MM KS STR REV CUT Y523H

## (undated) DEVICE — SUTURE VCRL SZ 0 L36IN ABSRB UD L36MM CT-1 1/2 CIR J946H

## (undated) DEVICE — TRAY URIN CATH 16FR DRNGE BG STATLOK STBL DEV F SURSTP